# Patient Record
Sex: FEMALE | Race: BLACK OR AFRICAN AMERICAN | Employment: UNEMPLOYED | URBAN - METROPOLITAN AREA
[De-identification: names, ages, dates, MRNs, and addresses within clinical notes are randomized per-mention and may not be internally consistent; named-entity substitution may affect disease eponyms.]

---

## 2017-02-16 RX ORDER — LORATADINE 10 MG/1
TABLET ORAL
Qty: 30 TABLET | Refills: 0 | Status: SHIPPED | OUTPATIENT
Start: 2017-02-16 | End: 2017-04-03 | Stop reason: SDUPTHER

## 2017-04-04 RX ORDER — LORATADINE 10 MG/1
TABLET ORAL
Qty: 30 TABLET | Refills: 0 | Status: SHIPPED | OUTPATIENT
Start: 2017-04-04 | End: 2021-05-05 | Stop reason: SDUPTHER

## 2017-06-16 RX ORDER — ALBUTEROL SULFATE 2.5 MG/3ML
SOLUTION RESPIRATORY (INHALATION)
Qty: 360 ML | Refills: 3 | Status: SHIPPED | OUTPATIENT
Start: 2017-06-16

## 2017-09-11 RX ORDER — FLUCONAZOLE 150 MG/1
TABLET ORAL
Qty: 1 TABLET | Refills: 0 | Status: SHIPPED | OUTPATIENT
Start: 2017-09-11 | End: 2021-05-05

## 2018-08-31 ENCOUNTER — HOSPITAL ENCOUNTER (OUTPATIENT)
Age: 53
Discharge: HOME | End: 2018-08-31
Payer: COMMERCIAL

## 2018-08-31 LAB
-: NORMAL
ABSOLUTE EOS #: 0.22 K/UL (ref 0–0.44)
ABSOLUTE IMMATURE GRANULOCYTE: <0.03 K/UL (ref 0–0.3)
ABSOLUTE LYMPH #: 3.04 K/UL (ref 1.1–3.7)
ABSOLUTE MONO #: 0.28 K/UL (ref 0.1–1.2)
ALBUMIN SERPL-MCNC: 4.5 G/DL (ref 3.5–5.2)
ALBUMIN/GLOBULIN RATIO: 1.6 (ref 1–2.5)
ALP BLD-CCNC: 81 U/L (ref 35–104)
ALT SERPL-CCNC: 20 U/L (ref 5–33)
AMORPHOUS: NORMAL
ANION GAP SERPL CALCULATED.3IONS-SCNC: 13 MMOL/L (ref 9–17)
AST SERPL-CCNC: 18 U/L
BACTERIA: NORMAL
BASOPHILS # BLD: 1 % (ref 0–2)
BASOPHILS ABSOLUTE: 0.04 K/UL (ref 0–0.2)
BILIRUB SERPL-MCNC: 0.37 MG/DL (ref 0.3–1.2)
BILIRUBIN URINE: NEGATIVE
BUN BLDV-MCNC: 9 MG/DL (ref 6–20)
BUN/CREAT BLD: ABNORMAL (ref 9–20)
CALCIUM SERPL-MCNC: 9.5 MG/DL (ref 8.6–10.4)
CASTS UA: NORMAL /LPF (ref 0–8)
CHLORIDE BLD-SCNC: 105 MMOL/L (ref 98–107)
CHOLESTEROL/HDL RATIO: 2.8
CHOLESTEROL: 176 MG/DL
CO2: 19 MMOL/L (ref 20–31)
COLOR: YELLOW
COMMENT UA: ABNORMAL
CREAT SERPL-MCNC: 0.74 MG/DL (ref 0.5–0.9)
CRYSTALS, UA: NORMAL /HPF
DIFFERENTIAL TYPE: ABNORMAL
EOSINOPHILS RELATIVE PERCENT: 4 % (ref 1–4)
EPITHELIAL CELLS UA: NORMAL /HPF (ref 0–5)
ESTIMATED AVERAGE GLUCOSE: 123 MG/DL
GFR AFRICAN AMERICAN: >60 ML/MIN
GFR NON-AFRICAN AMERICAN: >60 ML/MIN
GFR SERPL CREATININE-BSD FRML MDRD: ABNORMAL ML/MIN/{1.73_M2}
GFR SERPL CREATININE-BSD FRML MDRD: ABNORMAL ML/MIN/{1.73_M2}
GLUCOSE BLD-MCNC: 81 MG/DL (ref 70–99)
GLUCOSE URINE: NEGATIVE
HBA1C MFR BLD: 5.9 % (ref 4–6)
HCT VFR BLD CALC: 44 % (ref 36.3–47.1)
HDLC SERPL-MCNC: 63 MG/DL
HEMOGLOBIN: 13.8 G/DL (ref 11.9–15.1)
IMMATURE GRANULOCYTES: 0 %
KETONES, URINE: NEGATIVE
LDL CHOLESTEROL: 99 MG/DL (ref 0–130)
LEUKOCYTE ESTERASE, URINE: ABNORMAL
LYMPHOCYTES # BLD: 57 % (ref 24–43)
MCH RBC QN AUTO: 28.8 PG (ref 25.2–33.5)
MCHC RBC AUTO-ENTMCNC: 31.4 G/DL (ref 28.4–34.8)
MCV RBC AUTO: 91.9 FL (ref 82.6–102.9)
MONOCYTES # BLD: 5 % (ref 3–12)
MUCUS: NORMAL
NITRITE, URINE: NEGATIVE
NRBC AUTOMATED: 0 PER 100 WBC
OTHER OBSERVATIONS UA: NORMAL
PDW BLD-RTO: 13.1 % (ref 11.8–14.4)
PH UA: 7.5 (ref 5–8)
PLATELET # BLD: 276 K/UL (ref 138–453)
PLATELET ESTIMATE: ABNORMAL
PMV BLD AUTO: 9.5 FL (ref 8.1–13.5)
POTASSIUM SERPL-SCNC: 3.7 MMOL/L (ref 3.7–5.3)
PROTEIN UA: NEGATIVE
RBC # BLD: 4.79 M/UL (ref 3.95–5.11)
RBC # BLD: ABNORMAL 10*6/UL
RBC UA: NORMAL /HPF (ref 0–4)
RENAL EPITHELIAL, UA: NORMAL /HPF
SEG NEUTROPHILS: 33 % (ref 36–65)
SEGMENTED NEUTROPHILS ABSOLUTE COUNT: 1.77 K/UL (ref 1.5–8.1)
SODIUM BLD-SCNC: 137 MMOL/L (ref 135–144)
SPECIFIC GRAVITY UA: 1.02 (ref 1–1.03)
THYROXINE, FREE: 1.45 NG/DL (ref 0.93–1.7)
TOTAL PROTEIN: 7.4 G/DL (ref 6.4–8.3)
TRICHOMONAS: NORMAL
TRIGL SERPL-MCNC: 68 MG/DL
TSH SERPL DL<=0.05 MIU/L-ACNC: 0.64 MIU/L (ref 0.3–5)
TURBIDITY: CLEAR
URINE HGB: NEGATIVE
UROBILINOGEN, URINE: NORMAL
VITAMIN D 25-HYDROXY: 16.3 NG/ML (ref 30–100)
VLDLC SERPL CALC-MCNC: NORMAL MG/DL (ref 1–30)
WBC # BLD: 5.4 K/UL (ref 3.5–11.3)
WBC # BLD: ABNORMAL 10*3/UL
WBC UA: NORMAL /HPF (ref 0–5)
YEAST: NORMAL

## 2018-08-31 PROCEDURE — 84443 ASSAY THYROID STIM HORMONE: CPT

## 2018-08-31 PROCEDURE — 81001 URINALYSIS AUTO W/SCOPE: CPT

## 2018-08-31 PROCEDURE — 85025 COMPLETE CBC W/AUTO DIFF WBC: CPT

## 2018-08-31 PROCEDURE — 80053 COMPREHEN METABOLIC PANEL: CPT

## 2018-08-31 PROCEDURE — 80061 LIPID PANEL: CPT

## 2018-08-31 PROCEDURE — 36415 COLL VENOUS BLD VENIPUNCTURE: CPT

## 2018-08-31 PROCEDURE — 84439 ASSAY OF FREE THYROXINE: CPT

## 2018-08-31 PROCEDURE — 83036 HEMOGLOBIN GLYCOSYLATED A1C: CPT

## 2018-08-31 PROCEDURE — 82306 VITAMIN D 25 HYDROXY: CPT

## 2019-02-02 ENCOUNTER — TELEPHONE (OUTPATIENT)
Dept: GASTROENTEROLOGY | Age: 54
End: 2019-02-02

## 2019-02-19 ENCOUNTER — HOSPITAL ENCOUNTER (OUTPATIENT)
Age: 54
Setting detail: SPECIMEN
Discharge: HOME | End: 2019-02-19
Payer: COMMERCIAL

## 2019-02-20 LAB
DIRECT EXAM: NORMAL
Lab: NORMAL
SPECIMEN DESCRIPTION: NORMAL

## 2019-03-08 ENCOUNTER — TELEPHONE (OUTPATIENT)
Dept: GASTROENTEROLOGY | Age: 54
End: 2019-03-08

## 2019-04-24 ENCOUNTER — HOSPITAL ENCOUNTER (OUTPATIENT)
Dept: NON INVASIVE DIAGNOSTICS | Age: 54
Discharge: HOME | End: 2019-04-24
Payer: COMMERCIAL

## 2019-04-24 LAB
LV EF: 56 %
LVEF MODALITY: NORMAL

## 2019-04-24 PROCEDURE — 93306 TTE W/DOPPLER COMPLETE: CPT

## 2019-05-07 ENCOUNTER — TELEPHONE (OUTPATIENT)
Dept: GASTROENTEROLOGY | Age: 54
End: 2019-05-07

## 2019-05-08 NOTE — TELEPHONE ENCOUNTER
Per previous TE dated 3/8/19, we are waiting on return from patient in regards to date of her last colonoscopy. Writer left msg on pt's answering machine in regards to finding out the date of last colonoscopy.

## 2019-07-18 ENCOUNTER — HOSPITAL ENCOUNTER (OUTPATIENT)
Age: 54
Discharge: HOME | End: 2019-07-18
Payer: COMMERCIAL

## 2019-07-18 LAB
ALT SERPL-CCNC: 16 U/L (ref 5–33)
AST SERPL-CCNC: 16 U/L

## 2019-07-18 PROCEDURE — 84460 ALANINE AMINO (ALT) (SGPT): CPT

## 2019-07-18 PROCEDURE — 80074 ACUTE HEPATITIS PANEL: CPT

## 2019-07-18 PROCEDURE — 84450 TRANSFERASE (AST) (SGOT): CPT

## 2019-07-18 PROCEDURE — 36415 COLL VENOUS BLD VENIPUNCTURE: CPT

## 2019-07-19 LAB
HAV IGM SER IA-ACNC: NONREACTIVE
HEPATITIS B CORE IGM ANTIBODY: NONREACTIVE
HEPATITIS B SURFACE ANTIGEN: NONREACTIVE
HEPATITIS C ANTIBODY: NONREACTIVE

## 2019-08-20 ENCOUNTER — HOSPITAL ENCOUNTER (OUTPATIENT)
Age: 54
Discharge: HOME | End: 2019-08-20
Payer: COMMERCIAL

## 2019-08-20 LAB
ALBUMIN SERPL-MCNC: 4.5 G/DL (ref 3.5–5.2)
ALBUMIN/GLOBULIN RATIO: 1.7 (ref 1–2.5)
ALP BLD-CCNC: 81 U/L (ref 35–104)
ALT SERPL-CCNC: 14 U/L (ref 5–33)
AST SERPL-CCNC: 15 U/L
BILIRUB SERPL-MCNC: 0.36 MG/DL (ref 0.3–1.2)
BILIRUBIN DIRECT: 0.08 MG/DL
BILIRUBIN, INDIRECT: 0.28 MG/DL (ref 0–1)
GLOBULIN: NORMAL G/DL (ref 1.5–3.8)
TOTAL PROTEIN: 7.2 G/DL (ref 6.4–8.3)

## 2019-08-20 PROCEDURE — 80076 HEPATIC FUNCTION PANEL: CPT

## 2019-08-20 PROCEDURE — 36415 COLL VENOUS BLD VENIPUNCTURE: CPT

## 2019-10-31 ENCOUNTER — HOSPITAL ENCOUNTER (EMERGENCY)
Age: 54
Discharge: HOME | End: 2019-10-31
Attending: EMERGENCY MEDICINE
Payer: COMMERCIAL

## 2019-10-31 ENCOUNTER — APPOINTMENT (OUTPATIENT)
Dept: GENERAL RADIOLOGY | Age: 54
End: 2019-10-31
Payer: COMMERCIAL

## 2019-10-31 VITALS
DIASTOLIC BLOOD PRESSURE: 87 MMHG | OXYGEN SATURATION: 100 % | HEART RATE: 84 BPM | TEMPERATURE: 97.5 F | SYSTOLIC BLOOD PRESSURE: 136 MMHG | RESPIRATION RATE: 16 BRPM

## 2019-10-31 DIAGNOSIS — J06.9 VIRAL UPPER RESPIRATORY INFECTION: Primary | ICD-10-CM

## 2019-10-31 LAB
ABSOLUTE EOS #: 0.12 K/UL (ref 0–0.44)
ABSOLUTE IMMATURE GRANULOCYTE: <0.03 K/UL (ref 0–0.3)
ABSOLUTE LYMPH #: 2.33 K/UL (ref 1.1–3.7)
ABSOLUTE MONO #: 0.54 K/UL (ref 0.1–1.2)
ANION GAP SERPL CALCULATED.3IONS-SCNC: 13 MMOL/L (ref 9–17)
BASOPHILS # BLD: 1 % (ref 0–2)
BASOPHILS ABSOLUTE: 0.04 K/UL (ref 0–0.2)
BUN BLDV-MCNC: 12 MG/DL (ref 6–20)
BUN/CREAT BLD: ABNORMAL (ref 9–20)
CALCIUM SERPL-MCNC: 9.5 MG/DL (ref 8.6–10.4)
CHLORIDE BLD-SCNC: 107 MMOL/L (ref 98–107)
CO2: 23 MMOL/L (ref 20–31)
CREAT SERPL-MCNC: 0.86 MG/DL (ref 0.5–0.9)
DIFFERENTIAL TYPE: ABNORMAL
EOSINOPHILS RELATIVE PERCENT: 2 % (ref 1–4)
GFR AFRICAN AMERICAN: >60 ML/MIN
GFR NON-AFRICAN AMERICAN: >60 ML/MIN
GFR SERPL CREATININE-BSD FRML MDRD: ABNORMAL ML/MIN/{1.73_M2}
GFR SERPL CREATININE-BSD FRML MDRD: ABNORMAL ML/MIN/{1.73_M2}
GLUCOSE BLD-MCNC: 87 MG/DL (ref 70–99)
HCT VFR BLD CALC: 42.9 % (ref 36.3–47.1)
HEMOGLOBIN: 13.6 G/DL (ref 11.9–15.1)
IMMATURE GRANULOCYTES: 0 %
LYMPHOCYTES # BLD: 47 % (ref 24–43)
MCH RBC QN AUTO: 29.3 PG (ref 25.2–33.5)
MCHC RBC AUTO-ENTMCNC: 31.7 G/DL (ref 28.4–34.8)
MCV RBC AUTO: 92.5 FL (ref 82.6–102.9)
MONOCYTES # BLD: 11 % (ref 3–12)
NRBC AUTOMATED: 0 PER 100 WBC
PDW BLD-RTO: 13.2 % (ref 11.8–14.4)
PLATELET # BLD: 308 K/UL (ref 138–453)
PLATELET ESTIMATE: ABNORMAL
PMV BLD AUTO: 9.5 FL (ref 8.1–13.5)
POTASSIUM SERPL-SCNC: 3.5 MMOL/L (ref 3.7–5.3)
RBC # BLD: 4.64 M/UL (ref 3.95–5.11)
RBC # BLD: ABNORMAL 10*6/UL
SEG NEUTROPHILS: 39 % (ref 36–65)
SEGMENTED NEUTROPHILS ABSOLUTE COUNT: 1.98 K/UL (ref 1.5–8.1)
SODIUM BLD-SCNC: 143 MMOL/L (ref 135–144)
TROPONIN INTERP: NORMAL
TROPONIN T: NORMAL NG/ML
TROPONIN, HIGH SENSITIVITY: <6 NG/L (ref 0–14)
WBC # BLD: 5 K/UL (ref 3.5–11.3)
WBC # BLD: ABNORMAL 10*3/UL

## 2019-10-31 PROCEDURE — 80048 BASIC METABOLIC PNL TOTAL CA: CPT

## 2019-10-31 PROCEDURE — 93005 ELECTROCARDIOGRAM TRACING: CPT

## 2019-10-31 PROCEDURE — 85025 COMPLETE CBC W/AUTO DIFF WBC: CPT

## 2019-10-31 PROCEDURE — 71046 X-RAY EXAM CHEST 2 VIEWS: CPT

## 2019-10-31 PROCEDURE — 84484 ASSAY OF TROPONIN QUANT: CPT

## 2019-10-31 PROCEDURE — 99285 EMERGENCY DEPT VISIT HI MDM: CPT

## 2019-10-31 RX ORDER — BENZONATATE 100 MG/1
100 CAPSULE ORAL 3 TIMES DAILY PRN
Qty: 15 CAPSULE | Refills: 0 | Status: SHIPPED | OUTPATIENT
Start: 2019-10-31 | End: 2019-11-07

## 2019-10-31 RX ORDER — ACETAMINOPHEN 325 MG/1
650 TABLET ORAL EVERY 6 HOURS PRN
Qty: 20 TABLET | Refills: 0 | Status: SHIPPED | OUTPATIENT
Start: 2019-10-31 | End: 2021-05-05

## 2019-10-31 RX ORDER — IBUPROFEN 600 MG/1
600 TABLET ORAL EVERY 6 HOURS PRN
Qty: 20 TABLET | Refills: 0 | Status: SHIPPED | OUTPATIENT
Start: 2019-10-31 | End: 2019-12-16

## 2019-10-31 ASSESSMENT — PAIN SCALES - GENERAL: PAINLEVEL_OUTOF10: 6

## 2019-10-31 ASSESSMENT — ENCOUNTER SYMPTOMS
SHORTNESS OF BREATH: 1
NAUSEA: 0
DIARRHEA: 0
VOMITING: 0
RHINORRHEA: 1
ABDOMINAL PAIN: 0
COUGH: 1
SORE THROAT: 0

## 2019-10-31 ASSESSMENT — PAIN DESCRIPTION - DESCRIPTORS: DESCRIPTORS: ACHING;SORE

## 2019-10-31 ASSESSMENT — HEART SCORE: ECG: 0

## 2019-10-31 ASSESSMENT — PAIN DESCRIPTION - PAIN TYPE: TYPE: ACUTE PAIN

## 2019-10-31 ASSESSMENT — PAIN DESCRIPTION - FREQUENCY: FREQUENCY: CONTINUOUS

## 2019-10-31 ASSESSMENT — PAIN DESCRIPTION - LOCATION: LOCATION: GENERALIZED

## 2019-11-04 LAB
EKG ATRIAL RATE: 78 BPM
EKG P AXIS: 58 DEGREES
EKG P-R INTERVAL: 146 MS
EKG Q-T INTERVAL: 372 MS
EKG QRS DURATION: 78 MS
EKG QTC CALCULATION (BAZETT): 424 MS
EKG R AXIS: -31 DEGREES
EKG T AXIS: 15 DEGREES
EKG VENTRICULAR RATE: 78 BPM

## 2019-12-16 ENCOUNTER — HOSPITAL ENCOUNTER (EMERGENCY)
Age: 54
Discharge: HOME | End: 2019-12-16
Attending: EMERGENCY MEDICINE
Payer: COMMERCIAL

## 2019-12-16 ENCOUNTER — APPOINTMENT (OUTPATIENT)
Dept: GENERAL RADIOLOGY | Age: 54
End: 2019-12-16
Payer: COMMERCIAL

## 2019-12-16 VITALS
DIASTOLIC BLOOD PRESSURE: 76 MMHG | TEMPERATURE: 97.4 F | RESPIRATION RATE: 18 BRPM | OXYGEN SATURATION: 97 % | HEART RATE: 68 BPM | SYSTOLIC BLOOD PRESSURE: 110 MMHG

## 2019-12-16 DIAGNOSIS — M25.562 LEFT KNEE PAIN, UNSPECIFIED CHRONICITY: Primary | ICD-10-CM

## 2019-12-16 LAB
-: NORMAL
ABSOLUTE EOS #: 0.1 K/UL (ref 0–0.44)
ABSOLUTE IMMATURE GRANULOCYTE: <0.03 K/UL (ref 0–0.3)
ABSOLUTE LYMPH #: 2.62 K/UL (ref 1.1–3.7)
ABSOLUTE MONO #: 0.48 K/UL (ref 0.1–1.2)
AMORPHOUS: NORMAL
ANION GAP SERPL CALCULATED.3IONS-SCNC: 8 MMOL/L (ref 9–17)
BACTERIA: NORMAL
BASOPHILS # BLD: 1 % (ref 0–2)
BASOPHILS ABSOLUTE: 0.03 K/UL (ref 0–0.2)
BILIRUBIN URINE: NEGATIVE
BUN BLDV-MCNC: 12 MG/DL (ref 6–20)
BUN/CREAT BLD: ABNORMAL (ref 9–20)
C-REACTIVE PROTEIN: <0.3 MG/L (ref 0–5)
CALCIUM SERPL-MCNC: 9 MG/DL (ref 8.6–10.4)
CASTS UA: NORMAL /LPF (ref 0–8)
CHLORIDE BLD-SCNC: 109 MMOL/L (ref 98–107)
CO2: 26 MMOL/L (ref 20–31)
COLOR: YELLOW
COMMENT UA: ABNORMAL
CREAT SERPL-MCNC: 0.65 MG/DL (ref 0.5–0.9)
CRYSTALS, UA: NORMAL /HPF
DIFFERENTIAL TYPE: ABNORMAL
EOSINOPHILS RELATIVE PERCENT: 2 % (ref 1–4)
EPITHELIAL CELLS UA: NORMAL /HPF (ref 0–5)
GFR AFRICAN AMERICAN: >60 ML/MIN
GFR NON-AFRICAN AMERICAN: >60 ML/MIN
GFR SERPL CREATININE-BSD FRML MDRD: ABNORMAL ML/MIN/{1.73_M2}
GFR SERPL CREATININE-BSD FRML MDRD: ABNORMAL ML/MIN/{1.73_M2}
GLUCOSE BLD-MCNC: 86 MG/DL (ref 70–99)
GLUCOSE URINE: NEGATIVE
HCT VFR BLD CALC: 40 % (ref 36.3–47.1)
HEMOGLOBIN: 12.7 G/DL (ref 11.9–15.1)
IMMATURE GRANULOCYTES: 0 %
KETONES, URINE: NEGATIVE
LEUKOCYTE ESTERASE, URINE: ABNORMAL
LYMPHOCYTES # BLD: 50 % (ref 24–43)
MCH RBC QN AUTO: 29.4 PG (ref 25.2–33.5)
MCHC RBC AUTO-ENTMCNC: 31.8 G/DL (ref 28.4–34.8)
MCV RBC AUTO: 92.6 FL (ref 82.6–102.9)
MONOCYTES # BLD: 9 % (ref 3–12)
MUCUS: NORMAL
NITRITE, URINE: NEGATIVE
NRBC AUTOMATED: 0 PER 100 WBC
OTHER OBSERVATIONS UA: NORMAL
PDW BLD-RTO: 13.2 % (ref 11.8–14.4)
PH UA: 6 (ref 5–8)
PLATELET # BLD: 276 K/UL (ref 138–453)
PLATELET ESTIMATE: ABNORMAL
PMV BLD AUTO: 9.5 FL (ref 8.1–13.5)
POTASSIUM SERPL-SCNC: 3.8 MMOL/L (ref 3.7–5.3)
PROTEIN UA: NEGATIVE
RBC # BLD: 4.32 M/UL (ref 3.95–5.11)
RBC # BLD: ABNORMAL 10*6/UL
RBC UA: NORMAL /HPF (ref 0–4)
RENAL EPITHELIAL, UA: NORMAL /HPF
SEG NEUTROPHILS: 38 % (ref 36–65)
SEGMENTED NEUTROPHILS ABSOLUTE COUNT: 2.02 K/UL (ref 1.5–8.1)
SODIUM BLD-SCNC: 143 MMOL/L (ref 135–144)
SPECIFIC GRAVITY UA: 1.02 (ref 1–1.03)
TRICHOMONAS: NORMAL
TURBIDITY: CLEAR
URINE HGB: NEGATIVE
UROBILINOGEN, URINE: NORMAL
WBC # BLD: 5.3 K/UL (ref 3.5–11.3)
WBC # BLD: ABNORMAL 10*3/UL
WBC UA: NORMAL /HPF (ref 0–5)
YEAST: NORMAL

## 2019-12-16 PROCEDURE — 73562 X-RAY EXAM OF KNEE 3: CPT

## 2019-12-16 PROCEDURE — 81001 URINALYSIS AUTO W/SCOPE: CPT

## 2019-12-16 PROCEDURE — 80048 BASIC METABOLIC PNL TOTAL CA: CPT

## 2019-12-16 PROCEDURE — 87086 URINE CULTURE/COLONY COUNT: CPT

## 2019-12-16 PROCEDURE — 96374 THER/PROPH/DIAG INJ IV PUSH: CPT

## 2019-12-16 PROCEDURE — 93970 EXTREMITY STUDY: CPT

## 2019-12-16 PROCEDURE — 86140 C-REACTIVE PROTEIN: CPT

## 2019-12-16 PROCEDURE — 6360000002 HC RX W HCPCS: Performed by: STUDENT IN AN ORGANIZED HEALTH CARE EDUCATION/TRAINING PROGRAM

## 2019-12-16 PROCEDURE — 99284 EMERGENCY DEPT VISIT MOD MDM: CPT

## 2019-12-16 PROCEDURE — 85025 COMPLETE CBC W/AUTO DIFF WBC: CPT

## 2019-12-16 RX ORDER — IBUPROFEN 800 MG/1
800 TABLET ORAL EVERY 6 HOURS PRN
Qty: 21 TABLET | Refills: 0 | Status: SHIPPED | OUTPATIENT
Start: 2019-12-16 | End: 2021-05-05

## 2019-12-16 RX ORDER — KETOROLAC TROMETHAMINE 30 MG/ML
30 INJECTION, SOLUTION INTRAMUSCULAR; INTRAVENOUS ONCE
Status: DISCONTINUED | OUTPATIENT
Start: 2019-12-16 | End: 2019-12-16

## 2019-12-16 RX ORDER — KETOROLAC TROMETHAMINE 15 MG/ML
15 INJECTION, SOLUTION INTRAMUSCULAR; INTRAVENOUS ONCE
Status: COMPLETED | OUTPATIENT
Start: 2019-12-16 | End: 2019-12-16

## 2019-12-16 RX ADMIN — KETOROLAC TROMETHAMINE 15 MG: 15 INJECTION, SOLUTION INTRAMUSCULAR; INTRAVENOUS at 15:46

## 2019-12-16 ASSESSMENT — ENCOUNTER SYMPTOMS
SHORTNESS OF BREATH: 0
TROUBLE SWALLOWING: 0
VOMITING: 0
DIARRHEA: 0
FACIAL SWELLING: 0
BACK PAIN: 0
EYE PAIN: 0
VOICE CHANGE: 0
EYE ITCHING: 0
COUGH: 0
PHOTOPHOBIA: 0
RHINORRHEA: 0
NAUSEA: 0
EYE REDNESS: 0
SORE THROAT: 0
CONSTIPATION: 0
WHEEZING: 0
ABDOMINAL PAIN: 0

## 2019-12-16 ASSESSMENT — PAIN SCALES - GENERAL: PAINLEVEL_OUTOF10: 8

## 2019-12-17 LAB
CULTURE: NORMAL
Lab: NORMAL
SPECIMEN DESCRIPTION: NORMAL

## 2019-12-23 ENCOUNTER — OFFICE VISIT (OUTPATIENT)
Dept: ORTHOPEDIC SURGERY | Age: 54
End: 2019-12-23
Payer: COMMERCIAL

## 2019-12-23 VITALS — WEIGHT: 169.97 LBS | HEIGHT: 66 IN | BODY MASS INDEX: 27.32 KG/M2

## 2019-12-23 DIAGNOSIS — M17.0 BILATERAL PRIMARY OSTEOARTHRITIS OF KNEE: Primary | ICD-10-CM

## 2019-12-23 PROCEDURE — G8419 CALC BMI OUT NRM PARAM NOF/U: HCPCS | Performed by: ORTHOPAEDIC SURGERY

## 2019-12-23 PROCEDURE — 20610 DRAIN/INJ JOINT/BURSA W/O US: CPT | Performed by: ORTHOPAEDIC SURGERY

## 2019-12-23 PROCEDURE — 3017F COLORECTAL CA SCREEN DOC REV: CPT | Performed by: ORTHOPAEDIC SURGERY

## 2019-12-23 PROCEDURE — 99204 OFFICE O/P NEW MOD 45 MIN: CPT | Performed by: ORTHOPAEDIC SURGERY

## 2019-12-23 PROCEDURE — G8427 DOCREV CUR MEDS BY ELIG CLIN: HCPCS | Performed by: ORTHOPAEDIC SURGERY

## 2019-12-23 PROCEDURE — G8484 FLU IMMUNIZE NO ADMIN: HCPCS | Performed by: ORTHOPAEDIC SURGERY

## 2019-12-23 PROCEDURE — 4004F PT TOBACCO SCREEN RCVD TLK: CPT | Performed by: ORTHOPAEDIC SURGERY

## 2019-12-23 RX ORDER — BUPIVACAINE HYDROCHLORIDE 2.5 MG/ML
2 INJECTION, SOLUTION INFILTRATION; PERINEURAL ONCE
Status: COMPLETED | OUTPATIENT
Start: 2019-12-23 | End: 2019-12-23

## 2019-12-23 RX ORDER — METHYLPREDNISOLONE ACETATE 80 MG/ML
80 INJECTION, SUSPENSION INTRA-ARTICULAR; INTRALESIONAL; INTRAMUSCULAR; SOFT TISSUE ONCE
Status: COMPLETED | OUTPATIENT
Start: 2019-12-23 | End: 2019-12-23

## 2019-12-23 RX ADMIN — BUPIVACAINE HYDROCHLORIDE 5 MG: 2.5 INJECTION, SOLUTION INFILTRATION; PERINEURAL at 15:57

## 2019-12-23 RX ADMIN — METHYLPREDNISOLONE ACETATE 80 MG: 80 INJECTION, SUSPENSION INTRA-ARTICULAR; INTRALESIONAL; INTRAMUSCULAR; SOFT TISSUE at 15:59

## 2019-12-23 RX ADMIN — BUPIVACAINE HYDROCHLORIDE 5 MG: 2.5 INJECTION, SOLUTION INFILTRATION; PERINEURAL at 15:58

## 2019-12-30 ENCOUNTER — HOSPITAL ENCOUNTER (OUTPATIENT)
Dept: PHYSICAL THERAPY | Age: 54
Setting detail: THERAPIES SERIES
Discharge: HOME | End: 2019-12-30
Payer: COMMERCIAL

## 2019-12-30 PROCEDURE — 97110 THERAPEUTIC EXERCISES: CPT

## 2019-12-30 PROCEDURE — 97161 PT EVAL LOW COMPLEX 20 MIN: CPT

## 2020-01-07 ENCOUNTER — HOSPITAL ENCOUNTER (OUTPATIENT)
Dept: PHYSICAL THERAPY | Age: 55
Setting detail: THERAPIES SERIES
Discharge: HOME | End: 2020-01-07
Payer: COMMERCIAL

## 2020-01-07 PROCEDURE — 97110 THERAPEUTIC EXERCISES: CPT

## 2020-01-07 NOTE — FLOWSHEET NOTE
[x] [facility]  [address]  [phone]  F: [phone]     Physical Therapy Daily Treatment Note    Date:  2020  Patient Name:  Marcianne Krabbe      :  1965    MRN: [id number]  Physician: Jesica Laws DO/ Ines Pallas, DO       Insurance: [employer] 30 v  Medical Diagnosis: Bilateral knee OA M17.0                        Rehab Codes: H00.964, M25.561, M25.661, M25.662  Onset Date:                                    Next 's appt: ~3/23/2020     Visit# / total visits:    Cancels/No Shows: 0/0    Subjective:    Pain:  [] Yes  [] No Location: B knees   Pain Rating: (0-10 scale) 5/10  Pain altered Tx:  [x] No  [] Yes  Action:  Comments: patient reports knees \"are not doing good\" today in regards to her pain. States L knee tends to be more bothersome than the R knee. Objective:  Modalities:   Precautions:  Exercises:  Exercise Reps/ Time Weight/ Level Comments   SciFit            Seated      Hamstring Stretch on Step Stool  3x20\" ea     LAQ 15x ea           Supine      Quadriceps Isometric 10x5\" ea     Hamstring Isometric 10x5\" ea     Hip Adduction Isometric 15x5\"           SLR 10x ea     Heel Slides 15x ea     Bridge 15x     SAQ 15x ea           Other:    Specific Instructions for next treatment:  Bilateral knee and hip strengthening; progress slowly      Treatment Charges: Mins Units   [x]  HP 12 0   []  Ther Exercise 30 2   []  Manual Therapy     []  Ther Activities     []  Aquatics     []  Vasocompression     []  Other     Total Treatment time 30 2       Assessment: [x] Progressing toward goals: pt considerably deconditioned, limiting overall progressions to be made this date. While performing supine ex's, patient notes that she was sweating and was visibly perspiring. Distal IT band tightness in R knee, with lateral tracking noted. When palpated, pt is quite tender and reports pressure being painful. [] No change. [] Other:       STG: (to be met in 8 treatments)  1. ? Pain: 4/10 bilateral knee pain with standing activity. 2. ? ROM:Bilateral knee flexion 0-130 degrees to improve squatting and stair climbing. 3. ? Strength: 5/5 bilateral knee and hip strength to improve joint stability. 4. ? Function: LEFS score to 50% functional.   5. Independent with Home Exercise Programs     LTG: (to be met in 12 treatments)  1. Patient will report decreased incidence of knee swelling. 2. Patient will be able to stand/work for 8 hours.         Patient goals: Reduce pain, improve standing tolerance, reduce swelling.        Pt. Education:  [x] Yes  [] No  [x] Reviewed Prior HEP/Ed  Method of Education: [x] Verbal  [x] Demo  [] Written  Comprehension of Education:  [x] Verbalizes understanding. [] Demonstrates understanding. [x] Needs review. [] Demonstrates/verbalizes HEP/Ed previously given. Plan: [x] Continue per plan of care.    [] Other:   Frequency:  2x/week for 12 visits        Time In: 1119             Time Out: [address]      Electronically signed by:  Stella Boris, PTA

## 2020-01-09 ENCOUNTER — HOSPITAL ENCOUNTER (OUTPATIENT)
Dept: PHYSICAL THERAPY | Age: 55
Setting detail: THERAPIES SERIES
Discharge: HOME | End: 2020-01-09
Payer: COMMERCIAL

## 2020-01-09 PROCEDURE — 97110 THERAPEUTIC EXERCISES: CPT

## 2020-01-09 NOTE — FLOWSHEET NOTE
[x] [facility] &  Therapy  [address]  [phone]  F: [phone]     Physical Therapy Daily Treatment Note    Date:  2020  Patient Name:  Gloria Croft      :  1965    MRN: [id number]  Physician: Tiana Bryan DO/ Vicki Granger DO       Insurance: [employer]  Medical Diagnosis: Bilateral knee OA M17.0                        Rehab Codes: T35.834, M25.561, M25.661, M25.662  Onset Date: 2009                                   Next 's appt: ~3/23/2020     Visit# / total visits: 3/12   Cancels/No Shows: 0/0    Subjective:    Pain:  [] Yes  [x] No Location: B knees   Pain Rating: (0-10 scale) 0/10  Pain altered Tx:  [x] No  [] Yes  Action:  Comments: patient arrives with no complaints of knee pain this date. reports the HP following last Tx really helped relieve her knee pain. Objective:  Modalities:   Precautions:  Exercises:  Exercise Reps/ Time Weight/ Level Comments   SciFit 5 min Level 4          Standing      Gastroc Stretch on Wedge 3x30\"  Added 2020   Heel Raises 2x10  Added 2020               Seated      Hamstring Stretch on Step Stool  3x20\" ea     LAQ 15x ea     HS Curls 15x ea [place]          Supine      Quadriceps Isometric 10x5\" ea     Hamstring Isometric 10x5\" ea     Hip Adduction Isometric 15x5\"           SLR 15x ea  In sets as needed due to fatigue   Heel Slides 15x ea     Bridge 15x     SAQ 15x ea           Other:    Specific Instructions for next treatment:  Bilateral knee and hip strengthening; progress slowly      Treatment Charges: Mins Units   [x]  HP 15 0   []  Ther Exercise 40 3   []  Manual Therapy     []  Ther Activities     []  Aquatics     []  Vasocompression     []  Other     Total Treatment time 40 3       Assessment: [x] Progressing toward goals: continued with therapeutic exercises to improve LE strength and mobility for improved function.  Pt is heavily limited due to general deconditioning and

## 2020-01-14 ENCOUNTER — HOSPITAL ENCOUNTER (OUTPATIENT)
Dept: PHYSICAL THERAPY | Age: 55
Setting detail: THERAPIES SERIES
Discharge: HOME | End: 2020-01-14
Payer: COMMERCIAL

## 2020-01-14 PROCEDURE — 97110 THERAPEUTIC EXERCISES: CPT

## 2020-01-14 PROCEDURE — 97530 THERAPEUTIC ACTIVITIES: CPT

## 2020-01-14 NOTE — FLOWSHEET NOTE
[x] [facility]  [address]  [phone]  F: [phone]     Physical Therapy Daily Treatment Note    Date:  2020  Patient Name:  Marcianne Krabbe      :  1965    MRN: [id number]  Physician: Jesica Laws DO/ Ines Pallas, DO       Insurance: [employer] 30 v  Medical Diagnosis: Bilateral knee OA M17.0                        Rehab Codes: U46.347, M25.561, M25.661, M25.662  Onset Date:                                    Next 's appt: ~3/23/2020     Visit# / total visits:    Cancels/No Shows: 0/0    Subjective:    Pain:  [] Yes  [x] No Location: B knees   Pain Rating: (0-10 scale) 0/10  Pain altered Tx:  [x] No  [] Yes  Action:  Comments: patient again arrives noting no complaints of knee pain. Stating they have \"felt pretty good\" the past couple of days. Objective:  Modalities: Large HP to B knees in supine at end of Tx 15 min  Precautions: monitor BP,   Exercises:  Exercise Reps/ Time Weight/ Level Comments   SciFit 5 min Level 4          Standing      Gastroc Stretch on Wedge 3x30\"  Added 2020   Heel Raises 2x10  Added 2020   3 - Way Hip 10x ea     March 2x10     HS Curls 2x10           Seated      Hamstring Stretch on Step Stool  3x20\" ea     LAQ 15x ea     HS Curls 15x ea [place]          Supine      Quadriceps Isometric 10x5\" ea     Hamstring Isometric 10x5\" ea     Hip Adduction Isometric 15x5\"           SLR 15x ea  In sets as needed due to fatigue   Heel Slides 15x ea     Bridge 15x     SAQ 15x ea           Other:     Specific Instructions for next treatment:  Bilateral knee and hip strengthening; progress slowly      Treatment Charges: Mins Units   [x]  HP     []  Ther Exercise 25 2   []  Manual Therapy     []  Ther Activities 15 1   []  Aquatics     []  Vasocompression     []  Other     Total Treatment time 40 3       Assessment: [x] Progressing toward goals:     [] No change.      [x] Other: pt wore Holter review. [] Demonstrates/verbalizes HEP/Ed previously given. Plan: [x] Continue per plan of care.    [] Other:   Frequency:  2x/week for 12 visits        Time In: 1100              Time Out: 3383      Electronically signed by:  Denisse Donovan PTA

## 2020-01-16 ENCOUNTER — HOSPITAL ENCOUNTER (OUTPATIENT)
Dept: PHYSICAL THERAPY | Age: 55
Setting detail: THERAPIES SERIES
Discharge: HOME | End: 2020-01-16
Payer: COMMERCIAL

## 2020-01-16 PROCEDURE — 97110 THERAPEUTIC EXERCISES: CPT

## 2020-01-16 NOTE — FLOWSHEET NOTE
3   []  Manual Therapy     []  Ther Activities     []  Aquatics     []  Vasocompression     []  Other     Total Treatment time 45 3       Assessment: [x] Progressing toward goals: good tolerance with therapeutic exercises throughout Tx, with knee pain decreasing as Tx progressed. [] No change. [x] Other: HR monitor worn throughout Tx this date. HR stayed steady in the id 80's while completing standing ex's this date - with no incident of feeling lightheaded or dizzy. HR decreased to ow/mid 70's while completing mat ex's. No sudden spikes in HR noted in standing seated or supine positions while completing ex's this date. STG: (to be met in 8 treatments)  1. ? Pain: 4/10 bilateral knee pain with standing activity. 2. ? ROM:Bilateral knee flexion 0-130 degrees to improve squatting and stair climbing. 3. ? Strength: 5/5 bilateral knee and hip strength to improve joint stability. 4. ? Function: LEFS score to 50% functional.   5. Independent with Home Exercise Programs     LTG: (to be met in 12 treatments)  1. Patient will report decreased incidence of knee swelling. 2. Patient will be able to stand/work for 8 hours.         Patient goals: Reduce pain, improve standing tolerance, reduce swelling.        Pt. Education:  [x] Yes  [] No  [x] Reviewed Prior HEP/Ed  Method of Education: [x] Verbal  [x] Demo  [] Written  Comprehension of Education:  [x] Verbalizes understanding. [] Demonstrates understanding. [x] Needs review. [] Demonstrates/verbalizes HEP/Ed previously given. Plan: [x] Continue per plan of care.    [] Other:   Frequency:  2x/week for 12 visits        Time In: 1105              Time Out: 1200      Electronically signed by:  Stella Yorkshire, PTA

## 2020-01-21 ENCOUNTER — HOSPITAL ENCOUNTER (OUTPATIENT)
Dept: PHYSICAL THERAPY | Age: 55
Setting detail: THERAPIES SERIES
Discharge: HOME | End: 2020-01-21
Payer: COMMERCIAL

## 2020-01-21 PROCEDURE — 97110 THERAPEUTIC EXERCISES: CPT

## 2020-01-21 NOTE — FLOWSHEET NOTE
[x] [facility] &  Therapy  [address]  [phone]  F: [phone]     Physical Therapy Daily Treatment Note    Date:  2020  Patient Name:  Moncho Armando      :  1965    MRN: [id number]  Physician: Dereje Fink DO/ Hallie Romo DO       Insurance: [employer]  Medical Diagnosis: Bilateral knee OA M17.0                        Rehab Codes: L20.965, M25.561, M25.661, M25.662  Onset Date:                                    Next 's appt: 20     Visit# / total visits:    Cancels/No Shows: 0/0    Subjective:    Pain:  [] Yes  [x] No Location: B knees   Pain Rating: (0-10 scale) 6/10 grossly   Pain altered Tx:  [x] No  [] Yes  Action:  Comments: patient reports both of her knees, both of her shoulders and her low back have all been hurting since Saturday - when the temperature dropped.       Objective:  Modalities: Large HP to B knees in supine at end of Tx 15 min    Precautions: monitor HR/BP, suspected cardiovascular involvement     Exercises Completed on 20 marked with \"X\"  Exercise Reps/ Time Weight/ Level Comments    SciFit 5 min Level 4  X          Standing       Gastroc Stretch on Wedge 3x30\"  Added 2020 X   Heel Raises 2x10  Added 1/9/2020 X   3 - Way Hip 10x ea   X   March 2x10 ea   X   HS Curls 2x10 ea   X   Step Up 10x ea 6 inch  X   Step Down 10x ea 4 inch  X   Mini Squats                     Seated       Hamstring Stretch on Step Stool  3x20\" ea   X   LAQ 15x ea      HS Curls 15x ea [place]            Supine       Quadriceps Isometric 10x5\" ea   X   Hamstring Isometric 10x5\" ea      Hip Adduction Isometric 15x5\"             SLR 15x ea  In sets as needed due to fatigue X   Heel Slides 15x ea      Bridge 2x10   X   SAQ 15x ea             Other: **HR monitor worn throughout Tx 2020, 2020  - max  BPM**    Specific Instructions for next treatment:  Bilateral knee and hip strengthening; progress

## 2020-01-23 ENCOUNTER — HOSPITAL ENCOUNTER (OUTPATIENT)
Dept: PHYSICAL THERAPY | Age: 55
Setting detail: THERAPIES SERIES
Discharge: HOME | End: 2020-01-23
Payer: COMMERCIAL

## 2020-01-23 PROCEDURE — 97110 THERAPEUTIC EXERCISES: CPT

## 2020-01-23 NOTE — FLOWSHEET NOTE
treatment:  Bilateral knee and hip strengthening; progress slowly      Treatment Charges: Mins Units   [x]  HP     []  Ther Exercise 40 3   []  Manual Therapy     []  Ther Activities     []  Aquatics     []  Vasocompression     []  Other     Total Treatment time 40 3       Assessment: [x] Progressing toward goals: some ex's held this date, due to pt experiencing lightheadedness following standing ex's this date. Pt moved to supine with legs elevated, reminded to perform diaphragmatic breathing while resting. Able to complete some supine ex's, but overall Tx limited secondary to these symptoms. [] No change. [] Other:          STG: (to be met in 8 treatments)  1. ? Pain: 4/10 bilateral knee pain with standing activity. 2. ? ROM:Bilateral knee flexion 0-130 degrees to improve squatting and stair climbing. 3. ? Strength: 5/5 bilateral knee and hip strength to improve joint stability. 4. ? Function: LEFS score to 50% functional.   5. Independent with Home Exercise Programs     LTG: (to be met in 12 treatments)  1. Patient will report decreased incidence of knee swelling. 2. Patient will be able to stand/work for 8 hours.         Patient goals: Reduce pain, improve standing tolerance, reduce swelling.        Pt. Education:  [x] Yes  [] No  [x] Reviewed Prior HEP/Ed  Method of Education: [x] Verbal  [x] Demo  [] Written  Comprehension of Education:  [x] Verbalizes understanding. [] Demonstrates understanding. [x] Needs review. [] Demonstrates/verbalizes HEP/Ed previously given. Plan: [x] Continue per plan of care.    [] Other:   Frequency:  2x/week for 12 visits        Time In: 1118              Time Out: 1200      Electronically signed by:  Alejandro Felton PTA

## 2020-01-28 ENCOUNTER — HOSPITAL ENCOUNTER (OUTPATIENT)
Dept: PHYSICAL THERAPY | Age: 55
Setting detail: THERAPIES SERIES
Discharge: HOME | End: 2020-01-28
Payer: COMMERCIAL

## 2020-01-28 NOTE — FLOWSHEET NOTE
[x] [facility]) [facility] &  Therapy  [address]    [phone]  F: [phone]   [] [address] [facility] [address]   [address]  P: [phone]  F: [phone]  [] [address]  [address] [facility]  P: [phone]  F: [phone]  [] [address]  [facility]   [address]   [place]: [phone]  F: [phone]   [] [place] [address] [facility] [address]  [place]: [phone]   F: [phone]     Physical Therapy Cancel/No Show note    Date: 2020  Patient: Osbaldo Weaver  : 1965  MRN: [id number]    Cancels/No Shows to date:     For today's appointment patient:    []  Cancelled    [] Rescheduled appointment    [x] No-show      Reason given by patient:    []  Patient ill    []  Conflicting appointment    [] No transportation      [] Conflict with work    [] No reason given    [] Weather related    [] Other:      Comments:        [] Next appointment was confirmed    Electronically signed by: Marlin Szymanski PT

## 2020-01-29 ENCOUNTER — HOSPITAL ENCOUNTER (OUTPATIENT)
Age: 55
Discharge: HOME | End: 2020-01-29
Payer: COMMERCIAL

## 2020-01-29 LAB
ALBUMIN SERPL-MCNC: 4.5 G/DL (ref 3.5–5.2)
ALBUMIN/GLOBULIN RATIO: 1.5 (ref 1–2.5)
ALP BLD-CCNC: 88 U/L (ref 35–104)
ALT SERPL-CCNC: 22 U/L (ref 5–33)
ANION GAP SERPL CALCULATED.3IONS-SCNC: 12 MMOL/L (ref 9–17)
AST SERPL-CCNC: 20 U/L
BILIRUB SERPL-MCNC: 0.36 MG/DL (ref 0.3–1.2)
BUN BLDV-MCNC: 16 MG/DL (ref 6–20)
BUN/CREAT BLD: ABNORMAL (ref 9–20)
CALCIUM SERPL-MCNC: 9.6 MG/DL (ref 8.6–10.4)
CHLORIDE BLD-SCNC: 105 MMOL/L (ref 98–107)
CHOLESTEROL/HDL RATIO: 3.6
CHOLESTEROL: 231 MG/DL
CO2: 24 MMOL/L (ref 20–31)
CREAT SERPL-MCNC: 0.73 MG/DL (ref 0.5–0.9)
ESTIMATED AVERAGE GLUCOSE: 120 MG/DL
GFR AFRICAN AMERICAN: >60 ML/MIN
GFR NON-AFRICAN AMERICAN: >60 ML/MIN
GFR SERPL CREATININE-BSD FRML MDRD: ABNORMAL ML/MIN/{1.73_M2}
GFR SERPL CREATININE-BSD FRML MDRD: ABNORMAL ML/MIN/{1.73_M2}
GLUCOSE BLD-MCNC: 106 MG/DL (ref 70–99)
HBA1C MFR BLD: 5.8 % (ref 4–6)
HDLC SERPL-MCNC: 65 MG/DL
LDL CHOLESTEROL: 143 MG/DL (ref 0–130)
POTASSIUM SERPL-SCNC: 3.9 MMOL/L (ref 3.7–5.3)
SODIUM BLD-SCNC: 141 MMOL/L (ref 135–144)
TOTAL PROTEIN: 7.5 G/DL (ref 6.4–8.3)
TRIGL SERPL-MCNC: 113 MG/DL
VLDLC SERPL CALC-MCNC: ABNORMAL MG/DL (ref 1–30)

## 2020-01-29 PROCEDURE — 80053 COMPREHEN METABOLIC PANEL: CPT

## 2020-01-29 PROCEDURE — 83036 HEMOGLOBIN GLYCOSYLATED A1C: CPT

## 2020-01-29 PROCEDURE — 80061 LIPID PANEL: CPT

## 2020-01-29 PROCEDURE — 36415 COLL VENOUS BLD VENIPUNCTURE: CPT

## 2020-01-30 ENCOUNTER — HOSPITAL ENCOUNTER (OUTPATIENT)
Dept: PHYSICAL THERAPY | Age: 55
Setting detail: THERAPIES SERIES
Discharge: HOME | End: 2020-01-30
Payer: COMMERCIAL

## 2020-01-30 PROCEDURE — 97110 THERAPEUTIC EXERCISES: CPT

## 2020-01-30 NOTE — FLOWSHEET NOTE
[x] [facility] &  Therapy  [address]  [phone]  F: [phone]     Physical Therapy Daily Treatment Note    Date:  2020  Patient Name:  Khoi Paul      :  1965    MRN: [id number]  Physician: Tavo Chester DO/ Juan Guadarrama DO       Insurance: [employer] 30 v  Medical Diagnosis: Bilateral knee OA M17.0                        Rehab Codes: E99.764, M25.561, M25.661, M25.662  Onset Date:                                    Next 's appt: 20 w/Mariela Marrero     Visit# / total visits:    Cancels/No Shows: 0/0  (1 visit used )    Subjective:    Pain:  [x] Yes  [] No Location: B knees   Pain Rating: (0-10 scale) 8/10 grossly   Pain altered Tx:  [] No  [x] Yes  Action: limited exercise program  Comments: patient arrives with increased reports of pain in B knees, back and L shoulder this date. Notes she was going to cancel, but was agreeable to complete a modified program this date.      Objective:  Modalities: Large HP to B knees in supine at end of Tx 15 min    Precautions: monitor HR/BP, suspected cardiovascular involvement - fatigues quickly     Exercises Completed on 20 marked with \"X\"  Exercise Reps/ Time Weight/ Level Comments    SciFit 5 min Level 4  X          Standing       Gastroc Stretch on Wedge 3x30\"  Added 2020 X   Heel Raises 2x10  Added 1/9/2020 X   3 - Way Hip 15x ea  Increased reps 20 2x10 ea      HS Curls 2x10 ea      Step Up 10x ea 6 inch     Step Down 10x ea 4 inch     Mini Squats 10x                    Seated       Hamstring Stretch on Step Stool  3x20\" ea   X   LAQ 15x ea   X   HS Curls 15x ea [place]  X          Supine       Quadriceps Isometric 10x5\" ea   X   Hamstring Isometric 10x5\" ea      Hip Adduction Isometric 15x5\"   X          SLR 15x ea  In sets as needed due to fatigue X   Heel Slides 15x ea   X   Bridge 2x10   X   SAQ 15x ea             Other: **HR monitor worn throughout Tx 1/16/2020, 1/21/2020  - max  BPM**    Specific Instructions for next treatment:  Bilateral knee and hip strengthening; progress slowly      Treatment Charges: Mins Units   [x]  HP     []  Ther Exercise 30 2   []  Manual Therapy     []  Ther Activities     []  Aquatics     []  Vasocompression     []  Other     Total Treatment time 30 2       Assessment: [x] Progressing toward goals: some ex's held again this date due to increased reports of pain. Pt again educated on importance of mobility and strengthening ex's to improve her overall function and their benefits as they relate to her B knee OA. Patient states she is going to schedule an appt with Ortho nonetheless, and wants her pain to improve. [] No change. [] Other:          STG: (to be met in 8 treatments)  1. ? Pain: 4/10 bilateral knee pain with standing activity   2. ? ROM:Bilateral knee flexion 0-130 degrees to improve squatting and stair climbing. 3. ? Strength: 5/5 bilateral knee and hip strength to improve joint stability. 4. ? Function: LEFS score to 50% functional    5. Independent with Home Exercise Programs     LTG: (to be met in 12 treatments)  1. Patient will report decreased incidence of knee swelling. 2. Patient will be able to stand/work for 8 hours.         Patient goals: Reduce pain, improve standing tolerance, reduce swelling.        Pt. Education:  [x] Yes  [] No  [x] Reviewed Prior HEP/Ed  Method of Education: [x] Verbal  [x] Demo  [] Written  Comprehension of Education:  [x] Verbalizes understanding. [] Demonstrates understanding. [x] Needs review. [] Demonstrates/verbalizes HEP/Ed previously given. Plan: [x] Continue per plan of care.    [] Other:   Frequency:  2x/week for 12 visits        Time In: 1120              Time Out: 1210      Electronically signed by:  Odette Gupta PTA

## 2020-02-03 ENCOUNTER — TELEPHONE (OUTPATIENT)
Dept: GASTROENTEROLOGY | Age: 55
End: 2020-02-03

## 2020-02-03 NOTE — TELEPHONE ENCOUNTER
Contacted Liudmila to discuss scheduling colonoscopy. She states she has had a colonoscopy in the last 10 years but doesn't remember the date. Informed Saintclair Reese she can be scheduled for colonoscopy but she should check with insurance to find out date of last colonoscopy to make sure they will cover it at this time. She is calling [facility] and will call back to schedule. NP questionnaire scanned.  1st attempt

## 2020-02-05 ENCOUNTER — HOSPITAL ENCOUNTER (OUTPATIENT)
Age: 55
Discharge: HOME | End: 2020-02-05
Payer: COMMERCIAL

## 2020-02-05 ENCOUNTER — OFFICE VISIT (OUTPATIENT)
Dept: ORTHOPEDIC SURGERY | Age: 55
End: 2020-02-05
Payer: COMMERCIAL

## 2020-02-05 VITALS — BODY MASS INDEX: 31.82 KG/M2 | HEIGHT: 65 IN | WEIGHT: 191 LBS

## 2020-02-05 LAB
C-REACTIVE PROTEIN: 0.8 MG/L (ref 0–5)
RHEUMATOID FACTOR: <10 IU/ML
SEDIMENTATION RATE, ERYTHROCYTE: 10 MM (ref 0–20)

## 2020-02-05 PROCEDURE — 36415 COLL VENOUS BLD VENIPUNCTURE: CPT

## 2020-02-05 PROCEDURE — 86140 C-REACTIVE PROTEIN: CPT

## 2020-02-05 PROCEDURE — 99213 OFFICE O/P EST LOW 20 MIN: CPT | Performed by: STUDENT IN AN ORGANIZED HEALTH CARE EDUCATION/TRAINING PROGRAM

## 2020-02-05 PROCEDURE — 85651 RBC SED RATE NONAUTOMATED: CPT

## 2020-02-05 PROCEDURE — 86200 CCP ANTIBODY: CPT

## 2020-02-05 PROCEDURE — 86431 RHEUMATOID FACTOR QUANT: CPT

## 2020-02-05 RX ORDER — METHYLPREDNISOLONE 4 MG/1
TABLET ORAL
Qty: 1 KIT | Refills: 0 | Status: SHIPPED | OUTPATIENT
Start: 2020-02-05 | End: 2020-02-11

## 2020-02-06 ENCOUNTER — HOSPITAL ENCOUNTER (OUTPATIENT)
Dept: PHYSICAL THERAPY | Age: 55
Setting detail: THERAPIES SERIES
Discharge: HOME | End: 2020-02-06
Payer: COMMERCIAL

## 2020-02-06 LAB — CCP IGG ANTIBODIES: <1.5 U/ML

## 2020-02-06 PROCEDURE — 97110 THERAPEUTIC EXERCISES: CPT

## 2020-02-10 NOTE — PROGRESS NOTES
providing my signature indicating agreement. Objective :   Ht 5' 5\" (1.651 m)   Wt 191 lb (86.6 kg)   BMI 31.78 kg/m²   General: AAOx3, NAD, appears stated age  Ortho Exam  MSK: Mild globalized TTP about both knees with no discretely palpable area of maximal tenderness. AROM 0-90 bilaterally. Stable to varus and valgus stressing. Mild bilateral effusions. Additionally patient has pain with AROM bilateral shoulders, hips, wrists, and hands. Full AROM cervical spine with no pain. CV: no obvious JVD, no dependent edema, distal pulses 2+  Respiratory: chest rise symmetric, unlabored respirations, no audible wheezing  Skin: warm, well perfused, no obvious rashes or lesions  Psych: Patient displays understanding of exam, diagnosis, and plan. Radiology:   No new imaging obtained today, prior knee x-rays demonstrate moderate tricompartmental DJD to both knees. Assessment:      1. Polyarthralgia         Plan:      -Given some, though not very significant improvement, with therapy and anti-inflammatories recommend continuing these measures for now. As patient now complains of widespread joint pains with unclear family history will proceed with screening inflammatory arthropathy work-up.  -Also will provide Medrol dose tiffani to help alleviate symptoms in the interim. Resume Voltaren after completion of steroids. Continue PT.  -Follow up in clinic to review response to Medrol dosepak and results of labs. Follow up:Return in about 3 weeks (around 2/26/2020). Orders Placed This Encounter   Medications    methylPREDNISolone (MEDROL DOSEPACK) 4 MG tablet     Sig: Take by mouth.      Dispense:  1 kit     Refill:  0          Orders Placed This Encounter   Procedures    Sedimentation Rate     Standing Status:   Future     Number of Occurrences:   1     Standing Expiration Date:   2/5/2021    C-reactive protein    Rheumatoid Factor     Standing Status:   Future     Number of Occurrences:   1     Standing Expiration Date:   8/3/4631    Cyclic Citrul Peptide Antibody, IgG     Standing Status:   Future     Number of Occurrences:   1     Standing Expiration Date:   2/5/2021       Electronically signed by Deedee Hodges DO on 2/10/2020 at 6:49 PM

## 2020-02-12 ENCOUNTER — OFFICE VISIT (OUTPATIENT)
Dept: PODIATRY | Age: 55
End: 2020-02-12
Payer: COMMERCIAL

## 2020-02-12 VITALS
SYSTOLIC BLOOD PRESSURE: 120 MMHG | DIASTOLIC BLOOD PRESSURE: 87 MMHG | HEART RATE: 77 BPM | HEIGHT: 65 IN | BODY MASS INDEX: 31.82 KG/M2 | WEIGHT: 191 LBS

## 2020-02-12 PROCEDURE — G8427 DOCREV CUR MEDS BY ELIG CLIN: HCPCS | Performed by: STUDENT IN AN ORGANIZED HEALTH CARE EDUCATION/TRAINING PROGRAM

## 2020-02-12 PROCEDURE — 99202 OFFICE O/P NEW SF 15 MIN: CPT | Performed by: STUDENT IN AN ORGANIZED HEALTH CARE EDUCATION/TRAINING PROGRAM

## 2020-02-12 PROCEDURE — 3017F COLORECTAL CA SCREEN DOC REV: CPT | Performed by: STUDENT IN AN ORGANIZED HEALTH CARE EDUCATION/TRAINING PROGRAM

## 2020-02-12 PROCEDURE — 11721 DEBRIDE NAIL 6 OR MORE: CPT | Performed by: STUDENT IN AN ORGANIZED HEALTH CARE EDUCATION/TRAINING PROGRAM

## 2020-02-12 PROCEDURE — 2022F DILAT RTA XM EVC RTNOPTHY: CPT | Performed by: STUDENT IN AN ORGANIZED HEALTH CARE EDUCATION/TRAINING PROGRAM

## 2020-02-12 PROCEDURE — 3044F HG A1C LEVEL LT 7.0%: CPT | Performed by: STUDENT IN AN ORGANIZED HEALTH CARE EDUCATION/TRAINING PROGRAM

## 2020-02-12 PROCEDURE — 99203 OFFICE O/P NEW LOW 30 MIN: CPT | Performed by: STUDENT IN AN ORGANIZED HEALTH CARE EDUCATION/TRAINING PROGRAM

## 2020-02-12 PROCEDURE — 1036F TOBACCO NON-USER: CPT | Performed by: STUDENT IN AN ORGANIZED HEALTH CARE EDUCATION/TRAINING PROGRAM

## 2020-02-12 PROCEDURE — G8417 CALC BMI ABV UP PARAM F/U: HCPCS | Performed by: STUDENT IN AN ORGANIZED HEALTH CARE EDUCATION/TRAINING PROGRAM

## 2020-02-12 PROCEDURE — G8484 FLU IMMUNIZE NO ADMIN: HCPCS | Performed by: STUDENT IN AN ORGANIZED HEALTH CARE EDUCATION/TRAINING PROGRAM

## 2020-02-12 NOTE — PROGRESS NOTES
[address] [place] [address] [place]  Tel: [phone]   Fax: [phone]    Subjective     CC: Painful and elongated toe nails    HPI:  Gloria Croft is a 62y.o. year old female who presents to clinic today for diabetic foot examination and also complaining of painful and elongated toenails. The patient states she is a diabetic, and her last HgbA1c was 5.8% on 1/29/2020. She takes Metformin for the DM. The patient states their digits are painful when the toenails are elongated, causing rubbing in shoe gear. The patient admits to tingling and numbness in the bilateral plantar feet when she is on them for long periods of time, but resolved once she sits. She admits to OA affecting the knees, shoulders, and lower back. Patient denies any rest pain or claudication symptoms. The patient states she used to work at [employer] and has dropped a package on her right foot. She has seen Dr. Qi Cuevas in the past who gave her Terbinafin which she states cleared up her onychomycosis. The patient denies any other pedal complaints. Primary care physician is ADRIÁN Kern CNP.    ROS:    Constitutional: Denies nausea, vomiting, fever, chills. Neurologic: Admits numbness, tingling, and burning in the feet. Vascular: Denies symptoms of lower extremity claudication. Skin: Denies open wounds. Otherwise negative except as noted in the HPI.      PMH:  Past Medical History:   Diagnosis Date    Alcohol use disorder, mild, abuse     Allergic rhinitis 4/23/2015    Anxiety 3/22/2016    Asthma     Depression 3/22/2016    Fibroid, uterus     GERD (gastroesophageal reflux disease)     Restless leg 3/22/2016    Ulcer        Surgical History:   Past Surgical History:   Procedure Laterality Date    HYSTERECTOMY  2002    fibroids    KNEE SURGERY      rt knee baker cyst    ROTATOR CUFF REPAIR Right     TONSILLECTOMY AND ADENOIDECTOMY      TUBAL LIGATION  1989       Social History:  Social History     Tobacco Use    Smoking status: Former Smoker     Packs/day: 0.25     Last attempt to quit: 2018     Years since quittin.0    Smokeless tobacco: Never Used    Tobacco comment: Pt is trying to cut back cigarettes on her  own. Substance Use Topics    Alcohol use: Yes     Alcohol/week: 0.0 standard drinks     Comment: patient has been drinking 1 or 2 glasses of nick per day over the wk ends in the past. Last use---2 beers 4 days ago. [place] Drug use: Yes     Types: Marijuana     Comment: patient has abused crack cocaine from her early 25s until 20years of age over the weekends. Since very early , she has been smoking marijuana over the weekends probably 2 times a week and the last use was 4  days ago. She smokes 1 joint of MJ /day       Medications:  Prior to Admission medications    Medication Sig Start Date End Date Taking?  Authorizing Provider   diclofenac (VOLTAREN) 50 MG EC tablet Take 1 tablet by mouth 3 times daily (with meals) 19  Yes Arianna Brownlee DO   ibuprofen (IBU) 800 MG tablet Take 1 tablet by mouth every 6 hours as needed for Pain 19  Yes Saul Aden MD   acetaminophen (TYLENOL) 325 MG tablet Take 2 tablets by mouth every 6 hours as needed for Pain 10/31/19  Yes Lorenzo Mckeon MD   fluconazole (DIFLUCAN) 150 MG tablet TAKE 1 TABLET BY MOUTH FOR ONE DOSE 17  Yes Halina Atkinson MD   albuterol (PROVENTIL) (2.5 MG/3ML) 0.083% nebulizer solution USE 1 VIAL IN AEROSOL EVERY 6 HOURS AS NEEDED 17  Yes Halina Atkinson MD   loratadine (CLARITIN) 10 MG tablet TAKE 1 TABLET ONCE DAILY 17  Yes Halina Atkinson MD   nicotine (NICODERM CQ) 14 MG/24HR Place 1 patch onto the skin every 24 hours 8/3/16  Yes Halina Atkinson MD   sertraline (ZOLOFT) 100 MG tablet Take 2 tabs daily---Increase 16  Yes Sandra Hubbard MD   traZODone (DESYREL) 50 MG tablet Take 1 tablet by mouth nightly --Has supply 16  Yes Sandra Hubbard analysis: No early heel off, no abductory twist.    Assessment   Liudmila Tesfaye is a 62 y.o. female with     Diagnosis Orders   1. Onychomycosis  29455 - MS DEBRIDEMENT OF NAILS, 6 OR MORE   2. Controlled type 2 diabetes mellitus without complication, without long-term current use of insulin ([facility])  72402 - MS DEBRIDEMENT OF NAILS, 6 OR MORE   3. Hammer toe of left foot        Q9 findings  Plan   · Patient examined and evaluated. · Diagnosis and treatment options discussed in detail. · Nails 1-10 debrided in length and thickness sharply with sterile nail nippers without incident q9 findings. · Discussed her numbness and tingling symptoms likely from her arthritis stemming from her back. Instructed patient to discuss with ortho. Instructed patient to continue taking diclofenac from ortho. · Patient was educated on the utmost importance of tight glycemic control. · Patient was advised to continue daily foot checks, in addition to not ambulating barefoot. · Patient to RTC in 3 months. · Please, call the office with any questions or concerns     No orders of the defined types were placed in this encounter.     Orders Placed This Encounter   Procedures    95050 - MS DEBRIDEMENT OF NAILS, [address]   Podiatric Medicine & Surgery   2/12/2020 at 2:26 PM

## 2020-02-13 ENCOUNTER — HOSPITAL ENCOUNTER (OUTPATIENT)
Dept: PHYSICAL THERAPY | Age: 55
Setting detail: THERAPIES SERIES
Discharge: HOME | End: 2020-02-13
Payer: COMMERCIAL

## 2020-02-13 NOTE — FLOWSHEET NOTE
[x] [facility]) [facility] &  Therapy  [address]    [phone]  F: [phone]   [] [address] [facility] [address]   [address]  P: [phone]  F: [phone]  [] [place]  [address] [facility]  P: [phone]  F: [phone]  [] [address]  [facility]   [address]   [place]: [phone]  F: [phone]   [] [address]  [place]: [phone]   F: [phone]     Physical Therapy Cancel/No Show note    Date: 2020  Patient: Sonal Israel  : 1965  MRN: [id number]    Cancels/No Shows to date:     For today's appointment patient:    [x]  Cancelled    [] Rescheduled appointment    [] No-show     Reason given by patient:    [x]  Patient ill    []  Conflicting appointment    [] No transportation      [] Conflict with work    [] No reason given    [] Weather related    [] Other:      Comments: Will discharge if patient does not reschedule this week's appointments.        [] Next appointment was confirmed    Electronically signed by: Carmen Burgess PT

## 2020-02-20 ENCOUNTER — HOSPITAL ENCOUNTER (OUTPATIENT)
Dept: PHYSICAL THERAPY | Age: 55
Setting detail: THERAPIES SERIES
Discharge: HOME | End: 2020-02-20
Payer: COMMERCIAL

## 2020-02-20 PROCEDURE — 97110 THERAPEUTIC EXERCISES: CPT

## 2020-02-20 NOTE — PROGRESS NOTES
Bilateral knee and hip strengthening; progress slowly      Treatment Charges: Mins Units   [x]  HP     []  Ther Exercise 45 3   []  Manual Therapy     []  Ther Activities     []  Aquatics     []  Vasocompression     []  Other     Total Treatment time 45 3       Assessment: [x] Progressing toward goals: Patient still lacks adequate L  knee and hip strength. R Knee ROM and strength is adequate. Her left knee is much more limiting than her right. Patient still presents with endurance issues that limit her participation in PT and lacks compliance to her HEP. These issues have delayed therapeutic results. Recommend patient to complete her final 2 PT sessions and commit to completing her HEP 3x/week going forward to improve her knee dysfunction. She should also continue to f/u with orthopedics to discuss other forms of treatment. [] No change. [] Other:      ROM: R knee flexion 0-120, L knee flexion 0-130  MMT: R knee 5/5 flexion and extension, 4+/5 left knee flexion and extension. LEFS: 28% functional    STG: (to be met in 8 treatments)  1. ? Pain: 4/10 bilateral knee pain with standing activity Not Met  2. ? ROM:Bilateral knee flexion 0-130 degrees to improve squatting and stair climbing. Progress   3. ? Strength: 5/5 bilateral knee and hip strength to improve joint stability. Progress  4. ? Function: LEFS score to 50% functional  Not Met  5. Independent with Home Exercise Programs Not Met     LTG: (to be met in 12 treatments)  1. Patient will report decreased incidence of knee swelling. Not Met   2. Patient will be able to [facility] for 8 hours. 3-4 hrs max         Patient goals: Reduce pain, improve standing tolerance, reduce swelling.        Pt. Education:  [x] Yes  [] No  [x] Reviewed Prior HEP/Ed  Method of Education: [x] Verbal  [x] Demo  [] Written  Comprehension of Education:  [x] Verbalizes understanding. [] Demonstrates understanding. [x] Needs review.   [] Demonstrates/verbalizes HEP/Ed previously given.     Plan: [x] Continue per plan of care.    [] Other:   Frequency:  2x/week for 12 visits        Time In: 1055              Time Out: 1140      Electronically signed by:  Irving Chaudhari PT

## 2020-02-24 NOTE — TELEPHONE ENCOUNTER
Tried to contact patient, mobile number is busy, home number no answer, voicemail is full, unable to leave a message   2nd attempt

## 2020-02-27 ENCOUNTER — HOSPITAL ENCOUNTER (OUTPATIENT)
Dept: PHYSICAL THERAPY | Age: 55
Setting detail: THERAPIES SERIES
Discharge: HOME | End: 2020-02-27
Payer: COMMERCIAL

## 2020-02-27 NOTE — FLOWSHEET NOTE
[x] [facility]) [facility] &  Therapy  [address]    [phone]  F: [phone]   [] [address]  [facility]   [address]  P: [phone]  F: [phone]  [] [place]  [address] [facility]  P: [phone]  F: [phone]  [] [address]  [address] [facility] [address]   [address]   [place]: [phone]  F: [phone]   [] [facility]  [address] [facility] [address]  [place]: [phone]   F: [phone]     Physical Therapy Cancel/No Show note    Date: 2020  Patient: Rayray Ramsey  : 1965  MRN: [id number]    Cancels/No Shows to date:     For today's appointment patient:    []  Cancelled    [] Rescheduled appointment    [x] No-show     Reason given by patient:    []  Patient ill    []  Conflicting appointment    [] No transportation      [] Conflict with work    [] No reason given    [] Weather related    [] Other:      Comments: Will discharge due to attendance policy.          [] Next appointment was confirmed    Electronically signed by: Shahab Zuleta, PT

## 2020-03-10 NOTE — DISCHARGE SUMMARY
[x] [facility] &  Therapy  [address]  [phone]  F: [phone] [] [address]  [place] [address]   [address]  P: [phone]  F: [phone] [] [address]  [address] [facility]  P: [phone]  F: [phone] [] [address]  [address] [facility]   [address]   [place]: [phone]  F: [phone]      Physical Therapy Discharge Note    Date: 3/10/2020      Patient: Kevin Burton  : 1965  MRN: [id number]    Physician: Yahir Ann DO/ Yenni Mcnair        Insurance: [email or web address]  Medical Diagnosis: Bilateral knee OA M17.0                        Rehab Codes: M25.562, M25.561, M25.661, M25.662  Onset Date:                                           Next 's appt:   Visit# / total visits: 10/12                              Cancels/No Shows:   (1 visit used )      Subjective:    Pain:  [x]? Yes  []? No   Location: L knee           Pain Rating: (0-10 scale) 6/10 grossly   Pain altered Tx:  [x]? No  []? Yes  Action:   Comments: CINDY, Patient missed final 2 appointments. Assessment:  [x]? Progressing toward goals: Patient still lacks adequate L  knee and hip strength. R Knee ROM and strength is adequate. Her left knee is much more limiting than her right. Patient still presents with endurance issues that limit her participation in PT and lacks compliance to her HEP. These issues have delayed therapeutic results. Recommend patient to complete her final 2 PT sessions and commit to completing her HEP 3x/week going forward to improve her knee dysfunction. She should also continue to f/u with orthopedics to discuss other forms of treatment. []? No change.                           []? Other:       ROM: R knee flexion 0-120, L knee flexion 0-130  MMT: R knee 5/5 flexion and extension, 4+/5 left knee flexion and extension. LEFS: 28% functional     STG: (to be met in 8 treatments)  1. ? Pain: 4/10 bilateral knee pain with standing activity Not Met  2. ? ROM:Bilateral knee flexion 0-130 degrees to improve squatting and stair climbing. Progress   3. ? Strength: 5/5 bilateral knee and hip strength to improve joint stability. Progress  4. ? Function: LEFS score to 50% functional  Not Met  5. Independent with Home Exercise Programs Not Met     LTG: (to be met in 12 treatments)  1. Patient will report decreased incidence of knee swelling. Not Met   2. Patient will be able to stand/work for 8 hours. 3-4 hrs max        Treatment to Date:  [x] Therapeutic Exercise    [] Modalities:  [] Therapeutic Activity     [] Ultrasound  [] Electrical Stimulation  [] Gait Training     [] Massage       [] Lumbar/Cervical Traction  [] Neuromuscular Re-education [] Cold/hotpack [] Iontophoresis: 4 mg/mL  [] Instruction in Home Exercise Program                     Dexamethasone Sodium  [] Manual Therapy             Phosphate 40-80 mAmin  [] Aquatic Therapy                   [] Vasocompression/    [] Other:             Game Ready    Discharge Status:     [] Pt recovered from conditions. Treatment goals were met. [] Pt received maximum benefit. No further therapy indicated at this time. [x] Pt to continue exercise/home instructions independently. [] Therapy interrupted due to:    [] Pt has 2 or more no shows/cancels, is discontinued per our policy. [] Pt has completed prescribed number of treatment sessions. [] Other:         Electronically signed by Hannah Mahoney PT on 3/10/2020 at 7:36 AM      If you have any questions or concerns, please don't hesitate to call.   Thank you for your referral.

## 2020-10-09 ENCOUNTER — HOSPITAL ENCOUNTER (OUTPATIENT)
Age: 55
Discharge: HOME | End: 2020-10-09
Payer: COMMERCIAL

## 2020-10-09 LAB
ALBUMIN SERPL-MCNC: 4.7 G/DL (ref 3.5–5.2)
ALBUMIN/GLOBULIN RATIO: 1.7 (ref 1–2.5)
ALP BLD-CCNC: 90 U/L (ref 35–104)
ALT SERPL-CCNC: 28 U/L (ref 5–33)
ANION GAP SERPL CALCULATED.3IONS-SCNC: 14 MMOL/L (ref 9–17)
AST SERPL-CCNC: 21 U/L
BILIRUB SERPL-MCNC: 0.28 MG/DL (ref 0.3–1.2)
BUN BLDV-MCNC: 12 MG/DL (ref 6–20)
BUN/CREAT BLD: ABNORMAL (ref 9–20)
CALCIUM SERPL-MCNC: 9.6 MG/DL (ref 8.6–10.4)
CHLORIDE BLD-SCNC: 104 MMOL/L (ref 98–107)
CHOLESTEROL, FASTING: 208 MG/DL
CHOLESTEROL/HDL RATIO: 3.6
CO2: 22 MMOL/L (ref 20–31)
CREAT SERPL-MCNC: 0.75 MG/DL (ref 0.5–0.9)
CREATININE URINE: 147.3 MG/DL (ref 28–217)
ESTIMATED AVERAGE GLUCOSE: 131 MG/DL
GFR AFRICAN AMERICAN: >60 ML/MIN
GFR NON-AFRICAN AMERICAN: >60 ML/MIN
GFR SERPL CREATININE-BSD FRML MDRD: ABNORMAL ML/MIN/{1.73_M2}
GFR SERPL CREATININE-BSD FRML MDRD: ABNORMAL ML/MIN/{1.73_M2}
GLUCOSE BLD-MCNC: 117 MG/DL (ref 70–99)
HBA1C MFR BLD: 6.2 % (ref 4–6)
HDLC SERPL-MCNC: 57 MG/DL
LDL CHOLESTEROL: 127 MG/DL (ref 0–130)
MICROALBUMIN/CREAT 24H UR: <12 MG/L
MICROALBUMIN/CREAT UR-RTO: NORMAL MCG/MG CREAT
POTASSIUM SERPL-SCNC: 3.9 MMOL/L (ref 3.7–5.3)
SODIUM BLD-SCNC: 140 MMOL/L (ref 135–144)
TOTAL PROTEIN: 7.5 G/DL (ref 6.4–8.3)
TRIGLYCERIDE, FASTING: 120 MG/DL
VITAMIN D 25-HYDROXY: 22.4 NG/ML (ref 30–100)
VLDLC SERPL CALC-MCNC: ABNORMAL MG/DL (ref 1–30)

## 2020-10-09 PROCEDURE — 80061 LIPID PANEL: CPT

## 2020-10-09 PROCEDURE — 36415 COLL VENOUS BLD VENIPUNCTURE: CPT

## 2020-10-09 PROCEDURE — 82043 UR ALBUMIN QUANTITATIVE: CPT

## 2020-10-09 PROCEDURE — 80053 COMPREHEN METABOLIC PANEL: CPT

## 2020-10-09 PROCEDURE — 82306 VITAMIN D 25 HYDROXY: CPT

## 2020-10-09 PROCEDURE — 82570 ASSAY OF URINE CREATININE: CPT

## 2020-10-09 PROCEDURE — 83036 HEMOGLOBIN GLYCOSYLATED A1C: CPT

## 2021-03-30 DIAGNOSIS — M25.561 PAIN IN BOTH KNEES, UNSPECIFIED CHRONICITY: Primary | ICD-10-CM

## 2021-03-30 DIAGNOSIS — M25.562 PAIN IN BOTH KNEES, UNSPECIFIED CHRONICITY: Primary | ICD-10-CM

## 2021-04-01 ENCOUNTER — OFFICE VISIT (OUTPATIENT)
Dept: ORTHOPEDIC SURGERY | Age: 56
End: 2021-04-01
Payer: COMMERCIAL

## 2021-04-01 VITALS — BODY MASS INDEX: 31.65 KG/M2 | WEIGHT: 190 LBS | HEIGHT: 65 IN

## 2021-04-01 DIAGNOSIS — E55.9 VITAMIN D DEFICIENCY: ICD-10-CM

## 2021-04-01 DIAGNOSIS — S93.402A SPRAIN OF LEFT ANKLE, UNSPECIFIED LIGAMENT, INITIAL ENCOUNTER: ICD-10-CM

## 2021-04-01 DIAGNOSIS — M17.0 BILATERAL PRIMARY OSTEOARTHRITIS OF KNEE: ICD-10-CM

## 2021-04-01 DIAGNOSIS — M25.572 LEFT ANKLE PAIN, UNSPECIFIED CHRONICITY: Primary | ICD-10-CM

## 2021-04-01 PROCEDURE — 3017F COLORECTAL CA SCREEN DOC REV: CPT | Performed by: STUDENT IN AN ORGANIZED HEALTH CARE EDUCATION/TRAINING PROGRAM

## 2021-04-01 PROCEDURE — G8427 DOCREV CUR MEDS BY ELIG CLIN: HCPCS | Performed by: STUDENT IN AN ORGANIZED HEALTH CARE EDUCATION/TRAINING PROGRAM

## 2021-04-01 PROCEDURE — 1036F TOBACCO NON-USER: CPT | Performed by: STUDENT IN AN ORGANIZED HEALTH CARE EDUCATION/TRAINING PROGRAM

## 2021-04-01 PROCEDURE — 99214 OFFICE O/P EST MOD 30 MIN: CPT | Performed by: STUDENT IN AN ORGANIZED HEALTH CARE EDUCATION/TRAINING PROGRAM

## 2021-04-01 PROCEDURE — G8419 CALC BMI OUT NRM PARAM NOF/U: HCPCS | Performed by: STUDENT IN AN ORGANIZED HEALTH CARE EDUCATION/TRAINING PROGRAM

## 2021-04-01 RX ORDER — ERGOCALCIFEROL 1.25 MG/1
50000 CAPSULE ORAL WEEKLY
Qty: 8 CAPSULE | Refills: 0 | Status: SHIPPED | OUTPATIENT
Start: 2021-04-01 | End: 2022-05-25

## 2021-04-01 NOTE — PROGRESS NOTES
Orthopedic Clinic Progress Note    Subjective: Pt is a 60 y.o. female being seen for bilateral knee pain and left ankle pain. Patient was previously seen in our clinic on 02/10/2020 where there was concern for possible inflammatory arthritis pathology and patient was sent for further work-up. Patient's lab failed to demonstrate any inflammatory changes. Patient states she is unable to work because of her bilateral knee pain. Patient states all the job she is ever had the required to be on her feet and her knees limit her ability to work. Patient states over the past 2 weeks he is also developed left ankle pain. She also states that she also has bilateral hand swelling and pain with hand motion as well. Denies any new injuries. Denies any numbness or tingling. Patient did have an injection to her bilateral knees in December 2019 that she states helped for about a month. Patient very sedentary lifestyle. States she does not like to exercise or workout because she states all of her joints hurt. Patient has not been compliant with her diuretic medication and has not seen her PCP in over a year and a half. Constitutional: Negative for fever and chills. HENT: Negative for congestion or drainage   Eyes: Negative for blurred vision and double vision. Respiratory: Negative for cough, shortness of breath and wheezing. Cardiovascular: Negative for chest pain and palpitations. Gastrointestinal: Negative for nausea. Negative for vomiting. Musculoskeletal: Positive for myalgias and joint pain. Skin: Negative for itching and rash. Neurological: Negative for dizziness, sensory change and headaches. Psychiatric/Behavioral: Negative for depression and suicidal ideas. Objective: There were no vitals filed for this visit.   Gen: NAD, cooperative  Cardiovascular: Regular rate, no dependent edema, distal pulses 2+  Respiratory: Chest symmetric, no accessory muscle use, normal respirations LLE: Mild swelling and mild joint effusion about left knee. Mild lateral ankle swelling. Left ankle stable to anterior drawer and plantar flexion and dorsiflexion. Tenderness palpation about lateral malleolus. Active range of motion left knee 0-140 without pain. Tenderness palpation about medial lateral joint line. (-) Varus/Valgus Laxity with Stress. Negative logroll. (-) FADIR/BARBRAA. Leg warm well perfused. Sensation intact to light touch throughout lower extremity. RLE: No deformities or swelling about right knee. Active range of motion right knee 0-140 without pain. No joint line tenderness to palpation. (-) Varus/Valgus Laxity with Stress. Negative logroll. (-) FADIR/BARBARA. Leg warm well perfused. Sensation intact to light touch throughout lower extremity. Radiographs:  History:   59 yo female with right knee pain    Findings:   Views: 4V  Right Knee (AP/Lateral/Notch/Merchant)  Weight bearing: Yes   Findings: Tricompartmental degenerative joint disease with mild medial and lateral joint space narrowing. Consistent with Kellgren grade II changes (subchondral sclerosis, Joint space narrowing, osteophytes). Sharpening of tibial spines. Mild advancement when compared to prior films. No acute abnormality. Previous comparison films December 23, 2019    Impression:  Degenerative Changes right knee without acute osseous abnormality     History:   59 yo female with left knee pain    Findings:   Views: 4V  Left Knee (AP/Lateral/Notch/Merchant)  Weight bearing: Yes   Findings: Tricompartmental degenerative joint disease with moderate medial and lateral joint space narrowing. Consistent with Kellgren grade III changes (subchondral sclerosis, Joint space narrowing, osteophytes). Sharpening of tibial spines. Osteophytic changes about proximal tibiofibular joint. Mild advancement when compared to prior films. No acute abnormality.   Previous comparison films December 23, 2019    Impression: secondary to ankle sprain recommend ice and elevation as well as NSAID use. Instructed patient may return to normal activities as ankle pain allows. Prior labs were reviewed and demonstrated vitamin D deficiency. Instructed patient to take 50,000 units of vitamin D weekly for 8 weeks. Prescription provided. Patient then may proceed with daily vitamin D supplementation when 8-week supply is ended. All questions answered. Patient amenable to this plan. Shirley Mir DO  Orthopedic Surgery Resident  [address]

## 2021-04-08 NOTE — PROGRESS NOTES
I performed a history and physical examination of the patient and discussed management with the resident. I reviewed the physician assistant/resident physician note and agree with the documented findings and plan of care. Any areas of disagreement are noted on the chart. I have personally evaluated this patient and have completed at least one if not all key elements of the E/M (history, physical exam, and MDM). Additional findings are as noted. I agree with the chief complaint, past medical history, past surgical history, allergies, medications, social and family history as documented unless otherwise noted below.      Electronically signed by Kapil Lopez DO on 4/8/2021 at 7:28 AM

## 2021-05-05 ENCOUNTER — OFFICE VISIT (OUTPATIENT)
Dept: PRIMARY CARE CLINIC | Age: 56
End: 2021-05-05
Payer: COMMERCIAL

## 2021-05-05 VITALS
DIASTOLIC BLOOD PRESSURE: 89 MMHG | OXYGEN SATURATION: 98 % | HEART RATE: 83 BPM | BODY MASS INDEX: 31.38 KG/M2 | TEMPERATURE: 98 F | WEIGHT: 188.6 LBS | SYSTOLIC BLOOD PRESSURE: 127 MMHG

## 2021-05-05 DIAGNOSIS — Z12.11 SCREENING FOR MALIGNANT NEOPLASM OF COLON: ICD-10-CM

## 2021-05-05 DIAGNOSIS — R73.03 PRE-DIABETES: ICD-10-CM

## 2021-05-05 DIAGNOSIS — M25.471 ANKLE EDEMA, BILATERAL: ICD-10-CM

## 2021-05-05 DIAGNOSIS — I10 ESSENTIAL HYPERTENSION: Primary | ICD-10-CM

## 2021-05-05 DIAGNOSIS — Z76.0 MEDICATION REFILL: ICD-10-CM

## 2021-05-05 DIAGNOSIS — M25.472 ANKLE EDEMA, BILATERAL: ICD-10-CM

## 2021-05-05 DIAGNOSIS — Z12.31 ENCOUNTER FOR SCREENING MAMMOGRAM FOR BREAST CANCER: ICD-10-CM

## 2021-05-05 PROBLEM — M25.50 MULTIPLE JOINT PAIN: Status: ACTIVE | Noted: 2021-05-05

## 2021-05-05 LAB — HBA1C MFR BLD: 6.1 %

## 2021-05-05 PROCEDURE — 83036 HEMOGLOBIN GLYCOSYLATED A1C: CPT | Performed by: NURSE PRACTITIONER

## 2021-05-05 PROCEDURE — G8417 CALC BMI ABV UP PARAM F/U: HCPCS | Performed by: NURSE PRACTITIONER

## 2021-05-05 PROCEDURE — 1036F TOBACCO NON-USER: CPT | Performed by: NURSE PRACTITIONER

## 2021-05-05 PROCEDURE — G8427 DOCREV CUR MEDS BY ELIG CLIN: HCPCS | Performed by: NURSE PRACTITIONER

## 2021-05-05 PROCEDURE — 3017F COLORECTAL CA SCREEN DOC REV: CPT | Performed by: NURSE PRACTITIONER

## 2021-05-05 PROCEDURE — 99203 OFFICE O/P NEW LOW 30 MIN: CPT | Performed by: NURSE PRACTITIONER

## 2021-05-05 RX ORDER — FLUTICASONE PROPIONATE 50 MCG
2 SPRAY, SUSPENSION (ML) NASAL DAILY
Qty: 1 BOTTLE | Refills: 5 | Status: SHIPPED | OUTPATIENT
Start: 2021-05-05 | End: 2022-05-25

## 2021-05-05 RX ORDER — METOPROLOL SUCCINATE 25 MG/1
25 TABLET, EXTENDED RELEASE ORAL DAILY
Qty: 90 TABLET | Refills: 1 | Status: SHIPPED | OUTPATIENT
Start: 2021-05-05 | End: 2021-11-05

## 2021-05-05 RX ORDER — TRAZODONE HYDROCHLORIDE 50 MG/1
50 TABLET ORAL NIGHTLY
Qty: 1 TABLET | Refills: 0 | Status: SHIPPED | OUTPATIENT
Start: 2021-05-05 | End: 2022-05-25

## 2021-05-05 RX ORDER — SERTRALINE HYDROCHLORIDE 100 MG/1
TABLET, FILM COATED ORAL
Qty: 60 TABLET | Refills: 0 | Status: SHIPPED | OUTPATIENT
Start: 2021-05-05 | End: 2021-06-07 | Stop reason: SDUPTHER

## 2021-05-05 RX ORDER — ALBUTEROL SULFATE 90 UG/1
AEROSOL, METERED RESPIRATORY (INHALATION)
Qty: 18 G | Refills: 2 | Status: SHIPPED | OUTPATIENT
Start: 2021-05-05 | End: 2022-05-25 | Stop reason: SDUPTHER

## 2021-05-05 RX ORDER — FLUTICASONE PROPIONATE 110 UG/1
AEROSOL, METERED RESPIRATORY (INHALATION)
Qty: 12 G | Refills: 5 | Status: SHIPPED | OUTPATIENT
Start: 2021-05-05 | End: 2022-05-25 | Stop reason: SDUPTHER

## 2021-05-05 RX ORDER — LORATADINE 10 MG/1
TABLET ORAL
Qty: 30 TABLET | Refills: 2 | Status: SHIPPED | OUTPATIENT
Start: 2021-05-05 | End: 2022-05-25

## 2021-05-05 RX ORDER — HYDROCHLOROTHIAZIDE 12.5 MG/1
12.5 TABLET ORAL DAILY
Qty: 30 TABLET | Refills: 2 | Status: SHIPPED | OUTPATIENT
Start: 2021-05-05 | End: 2021-08-19

## 2021-05-05 SDOH — ECONOMIC STABILITY: FOOD INSECURITY: WITHIN THE PAST 12 MONTHS, YOU WORRIED THAT YOUR FOOD WOULD RUN OUT BEFORE YOU GOT MONEY TO BUY MORE.: OFTEN TRUE

## 2021-05-05 SDOH — ECONOMIC STABILITY: TRANSPORTATION INSECURITY
IN THE PAST 12 MONTHS, HAS LACK OF TRANSPORTATION KEPT YOU FROM MEETINGS, WORK, OR FROM GETTING THINGS NEEDED FOR DAILY LIVING?: NO

## 2021-05-05 SDOH — ECONOMIC STABILITY: INCOME INSECURITY: HOW HARD IS IT FOR YOU TO PAY FOR THE VERY BASICS LIKE FOOD, HOUSING, MEDICAL CARE, AND HEATING?: SOMEWHAT HARD

## 2021-05-05 SDOH — ECONOMIC STABILITY: FOOD INSECURITY: WITHIN THE PAST 12 MONTHS, THE FOOD YOU BOUGHT JUST DIDN'T LAST AND YOU DIDN'T HAVE MONEY TO GET MORE.: OFTEN TRUE

## 2021-05-05 ASSESSMENT — ENCOUNTER SYMPTOMS
CHEST TIGHTNESS: 0
RHINORRHEA: 0
CONSTIPATION: 0
SHORTNESS OF BREATH: 0
BLOOD IN STOOL: 0
DIARRHEA: 0
EYE DISCHARGE: 0
COUGH: 0
ABDOMINAL PAIN: 0
SINUS PRESSURE: 0

## 2021-05-05 NOTE — PROGRESS NOTES
Visit Information    Have you changed or started any medications since your last visit including any over-the-counter medicines, vitamins, or herbal medicines? no   Are you having any side effects from any of your medications? -  no  Have you stopped taking any of your medications? Is so, why? -  no    Have you seen any other physician or provider since your last visit? No  Have you had any other diagnostic tests since your last visit? No  Have you been seen in the emergency room and/or had an admission to a hospital since we last saw you? No  Have you had your routine dental cleaning in the past 6 months? no    Have you activated your [employer] account? If not, what are your barriers?  No       Patient Care Team:  ADRIÁN Alcazar - CNP as PCP - General (Nurse Practitioner)    Medical History Review  Past Medical, Family, and Social History reviewed and does not contribute to the patient presenting condition    Health Maintenance   Topic Date Due    Pneumococcal 0-64 years Vaccine (1 of 1 - PPSV23) Never done    COVID-19 Vaccine (1) Never done    DTaP/Tdap/Td vaccine (1 - Tdap) Never done    Shingles Vaccine (1 of 2) Never done    Cervical cancer screen  02/01/2017    Breast cancer screen  04/14/2018    Colon cancer screen colonoscopy  09/14/2020    Flu vaccine (Season Ended) 09/01/2021    A1C test (Diabetic or Prediabetic)  10/09/2021    Lipid screen  10/09/2025    Hepatitis C screen  Completed    HIV screen  Completed    Hepatitis A vaccine  Aged Out    Hepatitis B vaccine  Aged Out    Hib vaccine  Aged Out    Meningococcal (ACWY) vaccine  Aged Out

## 2021-05-05 NOTE — PROGRESS NOTES
[facility]  [address], [address]  [phone]    Michael Lake is a 60 y.o. female who presents today for her  medical conditions/complaintsas noted below. Michael Lake is c/o of Establish Care (ntp) and Swelling (ankles swelling left side )  . HPI:     HPI  Pt is here to establish. Used to see dr Kristen Martinez, hasn't been seen in a few years. Has gerd- used to take zantac- states she has a lot of bloating, some belching. bilateal ankle swelling. Was seen by ortho. Xray wnl. After discussion pt reports started swelling after starting diclofenac- not getting any exercise    htn- states taking metoprolol- states used to be on a water pill, I don't see one prescribed in [facility]. Ortho note also discusses non compliance with diuretic. Diet- tries to eat low sodium- drinks water  Exercise- no, states she needs to  jsut went to the dentist.     Wt Readings from Last 3 Encounters:   05/05/21 188 lb 9.6 oz (85.5 kg)   04/01/21 190 lb (86.2 kg)   02/12/20 191 lb (86.6 kg)       Nursing note reviewedand discussed with patient. Patient'smedications, allergies, past medical, surgical, social and family histories werereviewed and updated as appropriate. Current Outpatient Medications on File Prior to Visit   Medication Sig Dispense Refill    vitamin D (ERGOCALCIFEROL) 1.25 MG (64133 UT) CAPS capsule Take 1 capsule by mouth once a week 8 capsule 0    albuterol (PROVENTIL) (2.5 MG/3ML) 0.083% nebulizer solution USE 1 VIAL IN AEROSOL EVERY 6 HOURS AS NEEDED 360 mL 3     No current facility-administered medications on file prior to visit.       Past Medical History:   Diagnosis Date    Alcohol use disorder, mild, abuse     Allergic rhinitis 4/23/2015    Anxiety 3/22/2016    Asthma     Depression 3/22/2016    Fibroid, uterus     GERD (gastroesophageal reflux disease)     Osteoarthritis of shoulder region 10/28/2013    Restless dysphoric mood and self-injury. Objective:     /89   Pulse 83   Temp 98 °F (36.7 °C) (Temporal)   Wt 188 lb 9.6 oz (85.5 kg)   SpO2 98%   BMI 31.38 kg/m²    Physical Exam  Constitutional:       General: She is not in acute distress. Appearance: She is well-developed. She is not diaphoretic. HENT:      Head: Normocephalic and atraumatic. Right Ear: External ear normal.      Left Ear: External ear normal.      Nose: Nose normal.   Eyes:      Conjunctiva/sclera: Conjunctivae normal.      Pupils: Pupils are equal, round, and reactive to light. Neck:      Musculoskeletal: Full passive range of motion without pain and normal range of motion. Thyroid: No thyroid mass. Trachea: Trachea normal.   Cardiovascular:      Rate and Rhythm: Normal rate and regular rhythm. Pulses:           Dorsalis pedis pulses are 3+ on the right side and 3+ on the left side. Posterior tibial pulses are 3+ on the right side and 3+ on the left side. Heart sounds: Normal heart sounds, S1 normal and S2 normal. Heart sounds not distant. No friction rub. Pulmonary:      Effort: Pulmonary effort is normal. No accessory muscle usage or respiratory distress. Breath sounds: Normal breath sounds. Abdominal:      General: Bowel sounds are normal. There is no distension. Palpations: Abdomen is soft. There is no mass. Musculoskeletal: Normal range of motion. Right lower le+ Edema present. Left lower le+ Edema present. Comments: Pain free ROM     Feet:      Right foot:      Skin integrity: No ulcer, blister, skin breakdown, erythema, warmth, callus or dry skin. Left foot:      Skin integrity: No ulcer, blister, skin breakdown, erythema, warmth or callus. Lymphadenopathy:      Cervical: No cervical adenopathy. Skin:     General: Skin is warm and dry. Findings: No rash. Neurological:      Mental Status: She is oriented to person, place, and time. Comments: Gait is normal.   Psychiatric:         Behavior: Behavior normal.         Thought Content: Thought content normal.         Judgment: Judgment normal.       Lab Results   Component Value Date     10/09/2020    K 3.9 10/09/2020     10/09/2020    CO2 22 10/09/2020    BUN 12 10/09/2020    CREATININE 0.75 10/09/2020    GLUCOSE 117 (H) 10/09/2020    CALCIUM 9.6 10/09/2020    PROT 7.5 10/09/2020    LABALBU 4.7 10/09/2020    BILITOT 0.28 (L) 10/09/2020    ALKPHOS 90 10/09/2020    AST 21 10/09/2020    ALT 28 10/09/2020    LABGLOM >60 10/09/2020    GFRAA >60 10/09/2020    GLOB NOT REPORTED 08/20/2019       Lab Results   Component Value Date    WBC 5.3 12/16/2019    HGB 12.7 12/16/2019    HCT 40.0 12/16/2019    MCV 92.6 12/16/2019     12/16/2019          Assessment/Plan         1. Essential hypertension    - metoprolol succinate (TOPROL XL) 25 MG extended release tablet; Take 1 tablet by mouth daily  Dispense: 90 tablet; Refill: 1  - hydroCHLOROthiazide (HYDRODIURIL) 12.5 MG tablet; Take 1 tablet by mouth daily  Dispense: 30 tablet; Refill: 2    2. Pre-diabetes  Diet and exercise discussed at length  - POCT glycosylated hemoglobin (Hb A1C)    3. Encounter for screening mammogram for breast cancer    - JASMINE DIGITAL SCREEN W OR WO CAD BILATERAL; Future    4. Screening for malignant neoplasm of colon    - Cologuard (For External Results Only); Future    5. Medication refill    - diclofenac (VOLTAREN) 50 MG EC tablet; Take 1 tablet by mouth 3 times daily (with meals)  Dispense: 60 tablet; Refill: 3  - loratadine (CLARITIN) 10 MG tablet; TAKE 1 TABLET ONCE DAILY  Dispense: 30 tablet; Refill: 2  - sertraline (ZOLOFT) 100 MG tablet; Take 2 tabs daily---Increase  Dispense: 60 tablet; Refill: 0  - traZODone (DESYREL) 50 MG tablet; Take 1 tablet by mouth nightly --Has supply  Dispense: 1 tablet;  Refill: 0  - fluticasone (FLOVENT HFA) 110 MCG/ACT inhaler; INHALE 2 PUFFS TWICE DAILY  Dispense: 12 g; Refill: 5 MG extended release tablet 90 tablet 1     Sig: Take 1 tablet by mouth daily    hydroCHLOROthiazide (HYDRODIURIL) 12.5 MG tablet 30 tablet 2     Sig: Take 1 tablet by mouth daily         This note was transcribed using dictation with Dragon services. Efforts were made to correct any errors but some words may be misinterpreted.     Electronically signed by Hemant Morris CNP on 5/5/2021 at 3:59 PM

## 2021-05-17 ENCOUNTER — TELEPHONE (OUTPATIENT)
Dept: PRIMARY CARE CLINIC | Age: 56
End: 2021-05-17

## 2021-05-17 NOTE — TELEPHONE ENCOUNTER
Patient states that she can't do her colon guard  ,due to rectal bleeding from Hemorid on going for a week. She says Every time she uses the restroom Hemorid opens back up . Would like to be referred to a rectal dr if possible . Health Maintenance   Topic Date Due    Pneumococcal 0-64 years Vaccine (1 of 2 - PPSV23) Never done    DTaP/Tdap/Td vaccine (1 - Tdap) Never done    Shingles Vaccine (1 of 2) Never done    Cervical cancer screen  02/01/2017    Breast cancer screen  04/14/2018    Colon cancer screen colonoscopy  09/14/2020    Flu vaccine (Season Ended) 09/01/2021    Potassium monitoring  10/09/2021    Creatinine monitoring  10/09/2021    A1C test (Diabetic or Prediabetic)  05/05/2022    Lipid screen  10/09/2025    COVID-19 Vaccine  Completed    Hepatitis C screen  Completed    HIV screen  Completed    Hepatitis A vaccine  Aged Out    Hepatitis B vaccine  Aged Out    Hib vaccine  Aged Out    Meningococcal (ACWY) vaccine  Aged Out             (applicable per patient's age: Cancer Screenings, Depression Screening, Fall Risk Screening, Immunizations)    Hemoglobin A1C (%)   Date Value   05/05/2021 6.1   10/09/2020 6.2 (H)   01/29/2020 5.8     Microalb/Crt.  Ratio (mcg/mg creat)   Date Value   10/09/2020 CANNOT BE CALCULATED     LDL Cholesterol (mg/dL)   Date Value   10/09/2020 127     AST (U/L)   Date Value   10/09/2020 21     ALT (U/L)   Date Value   10/09/2020 28     BUN (mg/dL)   Date Value   10/09/2020 12      (goal A1C is < 7)   (goal LDL is <100) need 30-50% reduction from baseline     BP Readings from Last 3 Encounters:   05/05/21 127/89   02/12/20 120/87   12/16/19 110/76    (goal /80)      All Future Testing planned in [facility]:  Lab Frequency Next Occurrence   Cologuard (For External Results Only) Once 08/13/2021   JASMINE DIGITAL SCREEN W OR WO CAD BILATERAL Once 06/05/2021       Next Visit Date:  Future Appointments   Date Time Provider Constance Jorgensen   5/20/2021 10:00 AM STA SCR MAMMO RM 2 [facility] MAMMO [facility]   8/11/2021  2:30 PM Maricruz Hull, APRN - CNP [facility]            Patient Active Problem List:     Shoulder pain, right     Neck pain on left side     Need for prophylactic vaccination and inoculation against influenza     Eczema     Allergic rhinitis     Epigastric pain     Gastroesophageal reflux disease without esophagitis     Mild persistent asthma without complication     Knee pain, bilateral     Restless leg     Anxiety     Depression     Major depressive disorder, recurrent, severe without psychotic features (HCC)     Chronic post-traumatic stress disorder (PTSD)     Alcohol use disorder, mild, abuse     Cannabis use disorder, mild, abuse     Cocaine use disorder, mild, in sustained remission (HCC)     Chronic pain of left knee     Smoker     Acute pain of left knee     Osteoarthritis of shoulder region     Multiple joint pain     Full thickness rotator cuff tear     Disorder of bursae of shoulder region     Adhesive capsulitis of shoulder

## 2021-05-17 NOTE — TELEPHONE ENCOUNTER
Patient states that she cant use her colon guard due to rectal bleeding and would like to be referred to a rectal doctor. Patient also states that everytime she has a bm it busts back open . please advise   Health Maintenance   Topic Date Due    Pneumococcal 0-64 years Vaccine (1 of 2 - PPSV23) Never done    DTaP/Tdap/Td vaccine (1 - Tdap) Never done    Shingles Vaccine (1 of 2) Never done    Cervical cancer screen  02/01/2017    Breast cancer screen  04/14/2018    Colon cancer screen colonoscopy  09/14/2020    Flu vaccine (Season Ended) 09/01/2021    Potassium monitoring  10/09/2021    Creatinine monitoring  10/09/2021    A1C test (Diabetic or Prediabetic)  05/05/2022    Lipid screen  10/09/2025    COVID-19 Vaccine  Completed    Hepatitis C screen  Completed    HIV screen  Completed    Hepatitis A vaccine  Aged Out    Hepatitis B vaccine  Aged Out    Hib vaccine  Aged Out    Meningococcal (ACWY) vaccine  Aged Out             (applicable per patient's age: Cancer Screenings, Depression Screening, Fall Risk Screening, Immunizations)    Hemoglobin A1C (%)   Date Value   05/05/2021 6.1   10/09/2020 6.2 (H)   01/29/2020 5.8     Microalb/Crt.  Ratio (mcg/mg creat)   Date Value   10/09/2020 CANNOT BE CALCULATED     LDL Cholesterol (mg/dL)   Date Value   10/09/2020 127     AST (U/L)   Date Value   10/09/2020 21     ALT (U/L)   Date Value   10/09/2020 28     BUN (mg/dL)   Date Value   10/09/2020 12      (goal A1C is < 7)   (goal LDL is <100) need 30-50% reduction from baseline     BP Readings from Last 3 Encounters:   05/05/21 127/89   02/12/20 120/87   12/16/19 110/76    (goal /80)      All Future Testing planned in [facility]:  Lab Frequency Next Occurrence   Cologuard (For External Results Only) Once 08/13/2021   JASMINE DIGITAL SCREEN W OR WO CAD BILATERAL Once 06/05/2021       Next Visit Date:  Future Appointments   Date Time Provider Constance Jorgensen   5/20/2021 10:00 AM STA SCR MAMMO RM 2 ST[facility] MAMMO [facility] Radiolog   8/11/2021  2:30 PM Shane Ocampo, APRN - CNP [facility]            Patient Active Problem List:     Shoulder pain, right     Neck pain on left side     Need for prophylactic vaccination and inoculation against influenza     Eczema     Allergic rhinitis     Epigastric pain     Gastroesophageal reflux disease without esophagitis     Mild persistent asthma without complication     Knee pain, bilateral     Restless leg     Anxiety     Depression     Major depressive disorder, recurrent, severe without psychotic features (HCC)     Chronic post-traumatic stress disorder (PTSD)     Alcohol use disorder, mild, abuse     Cannabis use disorder, mild, abuse     Cocaine use disorder, mild, in sustained remission (HCC)     Chronic pain of left knee     Smoker     Acute pain of left knee     Osteoarthritis of shoulder region     Multiple joint pain     Full thickness rotator cuff tear     Disorder of bursae of shoulder region     Adhesive capsulitis of shoulder

## 2021-06-07 DIAGNOSIS — Z76.0 MEDICATION REFILL: ICD-10-CM

## 2021-06-07 RX ORDER — SERTRALINE HYDROCHLORIDE 100 MG/1
TABLET, FILM COATED ORAL
Qty: 180 TABLET | Refills: 1 | Status: SHIPPED | OUTPATIENT
Start: 2021-06-07 | End: 2022-05-25

## 2021-06-23 ENCOUNTER — OFFICE VISIT (OUTPATIENT)
Dept: PRIMARY CARE CLINIC | Age: 56
End: 2021-06-23
Payer: COMMERCIAL

## 2021-06-23 VITALS
HEART RATE: 83 BPM | SYSTOLIC BLOOD PRESSURE: 133 MMHG | OXYGEN SATURATION: 100 % | WEIGHT: 184 LBS | BODY MASS INDEX: 30.62 KG/M2 | TEMPERATURE: 97 F | DIASTOLIC BLOOD PRESSURE: 90 MMHG

## 2021-06-23 DIAGNOSIS — J01.90 ACUTE BACTERIAL SINUSITIS: Primary | ICD-10-CM

## 2021-06-23 DIAGNOSIS — B96.89 ACUTE BACTERIAL SINUSITIS: Primary | ICD-10-CM

## 2021-06-23 PROCEDURE — 3017F COLORECTAL CA SCREEN DOC REV: CPT | Performed by: NURSE PRACTITIONER

## 2021-06-23 PROCEDURE — G8417 CALC BMI ABV UP PARAM F/U: HCPCS | Performed by: NURSE PRACTITIONER

## 2021-06-23 PROCEDURE — G8427 DOCREV CUR MEDS BY ELIG CLIN: HCPCS | Performed by: NURSE PRACTITIONER

## 2021-06-23 PROCEDURE — 1036F TOBACCO NON-USER: CPT | Performed by: NURSE PRACTITIONER

## 2021-06-23 PROCEDURE — 99213 OFFICE O/P EST LOW 20 MIN: CPT | Performed by: NURSE PRACTITIONER

## 2021-06-23 RX ORDER — METHYLPREDNISOLONE 4 MG/1
TABLET ORAL
Qty: 1 KIT | Refills: 0 | Status: SHIPPED | OUTPATIENT
Start: 2021-06-23 | End: 2021-06-29

## 2021-06-23 RX ORDER — DOXYCYCLINE HYCLATE 100 MG/1
100 CAPSULE ORAL 2 TIMES DAILY
Qty: 20 CAPSULE | Refills: 0 | Status: SHIPPED | OUTPATIENT
Start: 2021-06-23 | End: 2021-07-03

## 2021-06-23 ASSESSMENT — ENCOUNTER SYMPTOMS
SINUS PRESSURE: 1
SHORTNESS OF BREATH: 1
SORE THROAT: 1

## 2021-06-23 NOTE — PROGRESS NOTES
Visit Information    Have you changed or started any medications since your last visit including any over-the-counter medicines, vitamins, or herbal medicines? no   Are you having any side effects from any of your medications? -  no  Have you stopped taking any of your medications? Is so, why? -  no    Have you seen any other physician or provider since your last visit? No  Have you had any other diagnostic tests since your last visit? No  Have you been seen in the emergency room and/or had an admission to a hospital since we last saw you? No  Have you had your routine dental cleaning in the past 6 months? no    Have you activated your [employer] account? If not, what are your barriers?  Yes     Patient Care Team:  ADRIÁN Hogan CNP as PCP - General (Family Nurse Practitioner)  ADRIÁN Hogan CNP as PCP - [facility]    Medical History Review  Past Medical, Family, and Social History reviewed and does contribute to the patient presenting condition    Health Maintenance   Topic Date Due    Pneumococcal 0-64 years Vaccine (1 of 2 - PPSV23) Never done    DTaP/Tdap/Td vaccine (1 - Tdap) Never done    Shingles Vaccine (1 of 2) Never done    Cervical cancer screen  02/01/2017    Breast cancer screen  04/14/2018    Colon cancer screen colonoscopy  09/14/2020    Flu vaccine (Season Ended) 09/01/2021    Potassium monitoring  10/09/2021    Creatinine monitoring  10/09/2021    A1C test (Diabetic or Prediabetic)  05/05/2022    Lipid screen  10/09/2025    COVID-19 Vaccine  Completed    Hepatitis C screen  Completed    HIV screen  Completed    Hepatitis A vaccine  Aged Out    Hepatitis B vaccine  Aged Out    Hib vaccine  Aged Out    Meningococcal (ACWY) vaccine  Aged Out

## 2021-06-23 NOTE — PROGRESS NOTES
[facility]  [address], [address] [facility]  [phone]    Oumou Hatch is a 60 y.o. female who presents today for her  medical conditions/complaintsas noted below. Oumou Hatch is c/o of Sinus Problem (for about 1 week ) and Wheezing  . HPI:     Sinusitis  This is a new problem. The current episode started 1 to 4 weeks ago. The problem has been gradually worsening since onset. There has been no fever. The pain is moderate. Associated symptoms include shortness of breath, sinus pressure, sneezing and a sore throat. Past treatments include acetaminophen and oral decongestants. The treatment provided no relief. z pack works for her she reports. Has been vaccinated    Nursing note reviewedand discussed with patient. Patient'smedications, allergies, past medical, surgical, social and family histories werereviewed and updated as appropriate.   Current Outpatient Medications on File Prior to Visit   Medication Sig Dispense Refill    sertraline (ZOLOFT) 100 MG tablet Take 2 tabs daily---Increase 180 tablet 1    diclofenac (VOLTAREN) 50 MG EC tablet Take 1 tablet by mouth 3 times daily (with meals) 60 tablet 3    loratadine (CLARITIN) 10 MG tablet TAKE 1 TABLET ONCE DAILY 30 tablet 2    traZODone (DESYREL) 50 MG tablet Take 1 tablet by mouth nightly --Has supply 1 tablet 0    fluticasone (FLOVENT HFA) 110 MCG/ACT inhaler INHALE 2 PUFFS TWICE DAILY 12 g 5    albuterol sulfate HFA (VENTOLIN HFA) 108 (90 Base) MCG/ACT inhaler INHALE 2 PUFFS EVERY 6 HOURS AS NEEDED FOR WHEEZING 18 g 2    fluticasone (FLONASE) 50 MCG/ACT nasal spray 2 sprays by Nasal route daily 1 Bottle 5    metoprolol succinate (TOPROL XL) 25 MG extended release tablet Take 1 tablet by mouth daily 90 tablet 1    hydroCHLOROthiazide (HYDRODIURIL) 12.5 MG tablet Take 1 tablet by mouth daily 30 tablet 2    vitamin D (ERGOCALCIFEROL) 1.25 MG (01862 UT) CAPS capsule Take 1 capsule by mouth once a week 8 capsule 0    albuterol (PROVENTIL) (2.5 MG/3ML) 0.083% nebulizer solution USE 1 VIAL IN AEROSOL EVERY 6 HOURS AS NEEDED 360 mL 3     No current facility-administered medications on file prior to visit. Past Medical History:   Diagnosis Date    Alcohol use disorder, mild, abuse     Allergic rhinitis 4/23/2015    Anxiety 3/22/2016    Asthma     Depression 3/22/2016    Fibroid, uterus     GERD (gastroesophageal reflux disease)     Osteoarthritis of shoulder region 10/28/2013    Restless leg 3/22/2016    Ulcer       Past Surgical History:   Procedure Laterality Date    HYSTERECTOMY  2002    fibroids    KNEE SURGERY      rt knee baker cyst    ROTATOR CUFF REPAIR Right     TONSILLECTOMY AND ADENOIDECTOMY      TUBAL LIGATION  1989     Family History   Problem Relation Age of Onset    Diabetes Mother     Hypertension Mother     Diabetes Father     Hypertension Father     Cancer Sister         breat ca    Heart Disease Brother     Cancer Maternal Grandfather         lung    Cancer Paternal Grandmother         uterine     Social History     Tobacco Use    Smoking status: Former Smoker     Packs/day: 0.25     Quit date: 2/5/2018     Years since quitting: 3.4    Smokeless tobacco: Never Used    Tobacco comment: Pt is trying to cut back cigarettes on her  own. Substance Use Topics    Alcohol use: Yes     Alcohol/week: 0.0 standard drinks     Comment: patient has been drinking 1 or 2 glasses of nick per day over the wk ends in the past. Last use---2 beers 4 days ago. .      Allergies   Allergen Reactions    Amoxicillin Hives and Itching    Pcn [Penicillins] Hives       Subjective:     Review of Systems   HENT: Positive for sinus pressure, sneezing and sore throat. Respiratory: Positive for shortness of breath.       Objective:     BP (!) 133/90 (Site: Left Upper Arm, Position: Sitting, Cuff Size: Medium Adult)   Pulse 83   Temp 97 °F (36.1 °C)   Wt 184 lb (83.5 kg)   SpO2 100%   BMI 30.62 kg/m²    Physical Exam  Constitutional:       Appearance: She is well-developed. HENT:      Right Ear: Tympanic membrane and external ear normal.      Left Ear: Tympanic membrane and external ear normal.      Nose: Nose normal.      Mouth/Throat:      Pharynx: Posterior oropharyngeal erythema present. Cardiovascular:      Rate and Rhythm: Normal rate and regular rhythm. Heart sounds: Normal heart sounds, S1 normal and S2 normal.   Pulmonary:      Effort: Pulmonary effort is normal. No respiratory distress. Breath sounds: Normal breath sounds. Musculoskeletal:         General: No deformity. Normal range of motion. Cervical back: Full passive range of motion without pain and normal range of motion. Skin:     General: Skin is warm and dry. Neurological:      Mental Status: She is alert and oriented to person, place, and time. Assessment/Plan         1. Acute bacterial sinusitis    - doxycycline hyclate (VIBRAMYCIN) 100 MG capsule; Take 1 capsule by mouth 2 times daily for 10 days Take with food, but avoid dairy, calcium and MTV's 2 hours before and after the dose  Dispense: 20 capsule; Refill: 0  - methylPREDNISolone (MEDROL, MARIELENA,) 4 MG tablet; Take as directed  Dispense: 1 kit; Refill: 0    RTO if symptoms worsen or fail to improve  Pt agreeable with plan      Patient given educationalmaterials - see patient instructions. Discussed use, benefit, and side effectsof prescribed medications. All patient questions answered. Pt voiced understanding. Reviewed health maintenance. Instructed to continue current medications, diet andexercise. 1.  Liudmila received counseling on the following healthy behaviors: medication adherence  2. Patient given educational materials when available - see patient instructionswhen applicable  3. Discussed use, benefit, and side effects of prescribed medications. Barriersto medication compliance addressed.   All patient questions answered. Pt voicedunderstanding. 4.  Reviewed prior labs and health maintenance  5. Continuecurrent medications, diet and exercise. Completed Refills   Requested Prescriptions     Signed Prescriptions Disp Refills    doxycycline hyclate (VIBRAMYCIN) 100 MG capsule 20 capsule 0     Sig: Take 1 capsule by mouth 2 times daily for 10 days Take with food, but avoid dairy, calcium and MTV's 2 hours before and after the dose    methylPREDNISolone (MEDROL, MARIELENA,) 4 MG tablet 1 kit 0     Sig: Take as directed         This note was transcribed using dictation with Dragon services. Efforts were made to correct any errors but some words may be misinterpreted.     Electronically signed by ADRIÁN Chavez CNP, CNP on 7/8/2021 at 2:57 PM

## 2021-08-11 ENCOUNTER — OFFICE VISIT (OUTPATIENT)
Dept: PRIMARY CARE CLINIC | Age: 56
End: 2021-08-11
Payer: COMMERCIAL

## 2021-08-11 VITALS
DIASTOLIC BLOOD PRESSURE: 99 MMHG | SYSTOLIC BLOOD PRESSURE: 139 MMHG | TEMPERATURE: 96.8 F | WEIGHT: 185 LBS | HEART RATE: 82 BPM | BODY MASS INDEX: 30.79 KG/M2 | OXYGEN SATURATION: 100 %

## 2021-08-11 DIAGNOSIS — R22.1 LUMP IN NECK: ICD-10-CM

## 2021-08-11 DIAGNOSIS — E55.9 VITAMIN D DEFICIENCY: ICD-10-CM

## 2021-08-11 DIAGNOSIS — I10 ESSENTIAL HYPERTENSION: Primary | ICD-10-CM

## 2021-08-11 PROCEDURE — 3017F COLORECTAL CA SCREEN DOC REV: CPT | Performed by: NURSE PRACTITIONER

## 2021-08-11 PROCEDURE — 1036F TOBACCO NON-USER: CPT | Performed by: NURSE PRACTITIONER

## 2021-08-11 PROCEDURE — G8417 CALC BMI ABV UP PARAM F/U: HCPCS | Performed by: NURSE PRACTITIONER

## 2021-08-11 PROCEDURE — G8427 DOCREV CUR MEDS BY ELIG CLIN: HCPCS | Performed by: NURSE PRACTITIONER

## 2021-08-11 PROCEDURE — 99213 OFFICE O/P EST LOW 20 MIN: CPT | Performed by: NURSE PRACTITIONER

## 2021-08-11 RX ORDER — MELATONIN
1000 DAILY
Qty: 90 TABLET | Refills: 1 | Status: SHIPPED | OUTPATIENT
Start: 2021-08-11

## 2021-08-11 RX ORDER — M-VIT,TX,IRON,MINS/CALC/FOLIC 27MG-0.4MG
1 TABLET ORAL DAILY
Qty: 90 TABLET | Refills: 11 | Status: SHIPPED | OUTPATIENT
Start: 2021-08-11 | End: 2022-08-11

## 2021-08-11 RX ORDER — IBUPROFEN 800 MG/1
800 TABLET ORAL 2 TIMES DAILY PRN
Qty: 20 TABLET | Refills: 0 | Status: SHIPPED | OUTPATIENT
Start: 2021-08-11

## 2021-08-11 NOTE — PROGRESS NOTES
WHEEZING 18 g 2    fluticasone (FLONASE) 50 MCG/ACT nasal spray 2 sprays by Nasal route daily 1 Bottle 5    metoprolol succinate (TOPROL XL) 25 MG extended release tablet Take 1 tablet by mouth daily 90 tablet 1    vitamin D (ERGOCALCIFEROL) 1.25 MG (41537 UT) CAPS capsule Take 1 capsule by mouth once a week 8 capsule 0    albuterol (PROVENTIL) (2.5 MG/3ML) 0.083% nebulizer solution USE 1 VIAL IN AEROSOL EVERY 6 HOURS AS NEEDED 360 mL 3     No current facility-administered medications on file prior to visit. Past Medical History:   Diagnosis Date    Alcohol use disorder, mild, abuse     Allergic rhinitis 4/23/2015    Anxiety 3/22/2016    Asthma     Depression 3/22/2016    Fibroid, uterus     GERD (gastroesophageal reflux disease)     Osteoarthritis of shoulder region 10/28/2013    Restless leg 3/22/2016    Ulcer       Past Surgical History:   Procedure Laterality Date    HYSTERECTOMY  2002    fibroids    KNEE SURGERY      rt knee baker cyst    ROTATOR CUFF REPAIR Right     TONSILLECTOMY AND ADENOIDECTOMY      TUBAL LIGATION  1989     Family History   Problem Relation Age of Onset    Diabetes Mother     Hypertension Mother     Diabetes Father     Hypertension Father     Cancer Sister         breat ca    Heart Disease Brother     Cancer Maternal Grandfather         lung    Cancer Paternal Grandmother         uterine     Social History     Tobacco Use    Smoking status: Former Smoker     Packs/day: 0.25     Quit date: 2/5/2018     Years since quitting: 3.5    Smokeless tobacco: Never Used    Tobacco comment: Pt is trying to cut back cigarettes on her  own. Substance Use Topics    Alcohol use: Yes     Alcohol/week: 0.0 standard drinks     Comment: patient has been drinking 1 or 2 glasses of nick per day over the wk ends in the past. Last use---2 beers 4 days ago. .      Allergies   Allergen Reactions    Amoxicillin Hives and Itching    Pcn [Penicillins] Hives       Subjective: Review of Systems   Constitutional: Negative for chills and fever. Respiratory: Negative for cough and shortness of breath. Cardiovascular: Negative for chest pain, palpitations and leg swelling. Musculoskeletal: Positive for neck pain. Objective:     BP (!) 139/99   Pulse 82   Temp 96.8 °F (36 °C) (Temporal)   Wt 185 lb (83.9 kg)   SpO2 100%   BMI 30.79 kg/m²    Physical Exam  Constitutional:       Appearance: She is well-developed. HENT:      Right Ear: External ear normal.      Left Ear: External ear normal.      Nose: Nose normal.   Cardiovascular:      Rate and Rhythm: Normal rate and regular rhythm. Heart sounds: Normal heart sounds, S1 normal and S2 normal.   Pulmonary:      Effort: Pulmonary effort is normal. No respiratory distress. Breath sounds: Normal breath sounds. Musculoskeletal:         General: No deformity. Normal range of motion. Cervical back: Full passive range of motion without pain and normal range of motion. Skin:     General: Skin is warm and dry. Neurological:      Mental Status: She is alert and oriented to person, place, and time. Lab Review not applicable      Assessment/Plan         1. Vitamin D deficiency      2. Essential hypertension  Take meds regularly  Improve diet and exercise    3. Lump in neck  Muscular, tender to light palpation. Notify if no improvement  - ibuprofen (ADVIL;MOTRIN) 800 MG tablet; Take 1 tablet by mouth 2 times daily as needed for Pain  Dispense: 20 tablet; Refill: 0    RTO if symptoms worsen or fail to improve  Pt agreeable with plan      Patient given educationalmaterials - see patient instructions. Discussed use, benefit, and side effectsof prescribed medications. All patient questions answered. Pt voiced understanding. Reviewed health maintenance. Instructed to continue current medications, diet andexercise.     1Jasper Gregoryita received counseling on the following healthy behaviors: nutrition, exercise and medication adherence  2. Patient given educational materials when available - see patient instructionswhen applicable  3. Discussed use, benefit, and side effects of prescribed medications. Barriersto medication compliance addressed. All patient questions answered. Pt voicedunderstanding. 4.  Reviewed prior labs and health maintenance  5. Continuecurrent medications, diet and exercise. Completed Refills   Requested Prescriptions     Signed Prescriptions Disp Refills    ibuprofen (ADVIL;MOTRIN) 800 MG tablet 20 tablet 0     Sig: Take 1 tablet by mouth 2 times daily as needed for Pain    vitamin D3 (CHOLECALCIFEROL) 25 MCG (1000 UT) TABS tablet 90 tablet 1     Sig: Take 1 tablet by mouth daily    Multiple Vitamins-Minerals (THERAPEUTIC MULTIVITAMIN-MINERALS) tablet 90 tablet 11     Sig: Take 1 tablet by mouth daily         This note was transcribed using dictation with Dragon services. Efforts were made to correct any errors but some words may be misinterpreted.     Electronically signed by ADRIÁN Miller CNP, CNP on 8/30/2021 at 7:56 AM

## 2021-08-11 NOTE — PROGRESS NOTES
Visit Information    Have you changed or started any medications since your last visit including any over-the-counter medicines, vitamins, or herbal medicines? no   Are you having any side effects from any of your medications? -  no  Have you stopped taking any of your medications? Is so, why? -  no    Have you seen any other physician or provider since your last visit? No  Have you had any other diagnostic tests since your last visit? No  Have you been seen in the emergency room and/or had an admission to a hospital since we last saw you? No  Have you had your routine dental cleaning in the past 6 months? yes -     Have you activated your [employer] account? If not, what are your barriers?  Yes     Patient Care Team:  ADRIÁN Rider CNP as PCP - General (Family Nurse Practitioner)  ADRIÁN Rider CNP as PCP - [facility] Provider    Medical History Review  Past Medical, Family, and Social History reviewed and does not contribute to the patient presenting condition    Health Maintenance   Topic Date Due    Pneumococcal 0-64 years Vaccine (1 of 2 - PPSV23) Never done    DTaP/Tdap/Td vaccine (1 - Tdap) Never done    Shingles Vaccine (1 of 2) Never done    Cervical cancer screen  02/01/2017    Breast cancer screen  04/14/2018    Colon cancer screen colonoscopy  09/14/2020    Flu vaccine (1) 09/01/2021    Potassium monitoring  10/09/2021    Creatinine monitoring  10/09/2021    A1C test (Diabetic or Prediabetic)  05/05/2022    Lipid screen  10/09/2025    COVID-19 Vaccine  Completed    Hepatitis C screen  Completed    HIV screen  Completed    Hepatitis A vaccine  Aged Out    Hepatitis B vaccine  Aged Out    Hib vaccine  Aged Out    Meningococcal (ACWY) vaccine  Aged Out

## 2021-08-19 DIAGNOSIS — I10 ESSENTIAL HYPERTENSION: ICD-10-CM

## 2021-08-19 RX ORDER — HYDROCHLOROTHIAZIDE 12.5 MG/1
TABLET ORAL
Qty: 30 TABLET | Refills: 2 | Status: SHIPPED | OUTPATIENT
Start: 2021-08-19 | End: 2021-10-26

## 2021-08-30 ASSESSMENT — ENCOUNTER SYMPTOMS
COUGH: 0
SHORTNESS OF BREATH: 0

## 2021-10-25 DIAGNOSIS — I10 ESSENTIAL HYPERTENSION: ICD-10-CM

## 2021-10-25 NOTE — TELEPHONE ENCOUNTER
Hemoglobin A1C (%)   Date Value   05/05/2021 6.1   10/09/2020 6.2 (H)   01/29/2020 5.8             ( goal A1C is < 7)   Microalb/Crt.  Ratio (mcg/mg creat)   Date Value   10/09/2020 CANNOT BE CALCULATED     LDL Cholesterol (mg/dL)   Date Value   10/09/2020 127       (goal LDL is <100)   AST (U/L)   Date Value   10/09/2020 21     ALT (U/L)   Date Value   10/09/2020 28     BUN (mg/dL)   Date Value   10/09/2020 12     BP Readings from Last 3 Encounters:   08/11/21 (!) 139/99   06/23/21 (!) 133/90   05/05/21 127/89          (goal 120/80)        Patient Active Problem List:     Shoulder pain, right     Neck pain on left side     Need for prophylactic vaccination and inoculation against influenza     Eczema     Allergic rhinitis     Epigastric pain     Gastroesophageal reflux disease without esophagitis     Mild persistent asthma without complication     Knee pain, bilateral     Restless leg     Anxiety     Depression     Major depressive disorder, recurrent, severe without psychotic features (HCC)     Chronic post-traumatic stress disorder (PTSD)     Alcohol use disorder, mild, abuse     Cannabis use disorder, mild, abuse     Cocaine use disorder, mild, in sustained remission (HCC)     Chronic pain of left knee     Smoker     Acute pain of left knee     Osteoarthritis of shoulder region     Multiple joint pain     Full thickness rotator cuff tear     Disorder of bursae of shoulder region     Adhesive capsulitis of shoulder      ----María Soares S/W pt  Pt stated the needle sites look good, denies S&S of infection, denies fevers and denies sun burn like sensation  Pt stated she is alittle sore now  Advised pt if she has pain to take her prescribed or OTC pain medication and/or use ice/heat and that it takes 4 to 6 weeks to see the full effect  Confirmed next appt w/ pt  Pt verbalized understanding

## 2021-10-26 RX ORDER — HYDROCHLOROTHIAZIDE 12.5 MG/1
TABLET ORAL
Qty: 30 TABLET | Refills: 2 | Status: SHIPPED | OUTPATIENT
Start: 2021-10-26 | End: 2022-05-25

## 2021-11-04 DIAGNOSIS — I10 ESSENTIAL HYPERTENSION: ICD-10-CM

## 2021-11-05 RX ORDER — METOPROLOL SUCCINATE 25 MG/1
TABLET, EXTENDED RELEASE ORAL
Qty: 90 TABLET | Refills: 1 | Status: SHIPPED | OUTPATIENT
Start: 2021-11-05 | End: 2022-05-25

## 2022-04-22 DIAGNOSIS — Z76.0 MEDICATION REFILL: ICD-10-CM

## 2022-04-22 RX ORDER — FLUTICASONE PROPIONATE 110 UG/1
AEROSOL, METERED RESPIRATORY (INHALATION)
Qty: 12 EACH | Refills: 5 | OUTPATIENT
Start: 2022-04-22

## 2022-05-23 DIAGNOSIS — I10 ESSENTIAL HYPERTENSION: ICD-10-CM

## 2022-05-25 ENCOUNTER — OFFICE VISIT (OUTPATIENT)
Dept: PRIMARY CARE CLINIC | Age: 57
End: 2022-05-25
Payer: COMMERCIAL

## 2022-05-25 VITALS
DIASTOLIC BLOOD PRESSURE: 94 MMHG | SYSTOLIC BLOOD PRESSURE: 128 MMHG | OXYGEN SATURATION: 99 % | HEART RATE: 77 BPM | BODY MASS INDEX: 31.78 KG/M2 | TEMPERATURE: 97.1 F | WEIGHT: 191 LBS

## 2022-05-25 DIAGNOSIS — R20.0 NUMBNESS AND TINGLING OF BOTH FEET: ICD-10-CM

## 2022-05-25 DIAGNOSIS — Z13.29 SCREENING FOR THYROID DISORDER: ICD-10-CM

## 2022-05-25 DIAGNOSIS — Z12.31 ENCOUNTER FOR SCREENING MAMMOGRAM FOR BREAST CANCER: ICD-10-CM

## 2022-05-25 DIAGNOSIS — R73.03 PRE-DIABETES: Primary | ICD-10-CM

## 2022-05-25 DIAGNOSIS — Z76.0 MEDICATION REFILL: ICD-10-CM

## 2022-05-25 DIAGNOSIS — I10 ESSENTIAL HYPERTENSION: ICD-10-CM

## 2022-05-25 DIAGNOSIS — J45.30 MILD PERSISTENT ASTHMA WITHOUT COMPLICATION: ICD-10-CM

## 2022-05-25 DIAGNOSIS — R79.89 LOW VITAMIN D LEVEL: ICD-10-CM

## 2022-05-25 DIAGNOSIS — R20.2 NUMBNESS AND TINGLING OF BOTH FEET: ICD-10-CM

## 2022-05-25 LAB — HBA1C MFR BLD: 6 %

## 2022-05-25 PROCEDURE — 1036F TOBACCO NON-USER: CPT | Performed by: NURSE PRACTITIONER

## 2022-05-25 PROCEDURE — 83036 HEMOGLOBIN GLYCOSYLATED A1C: CPT | Performed by: NURSE PRACTITIONER

## 2022-05-25 PROCEDURE — 99214 OFFICE O/P EST MOD 30 MIN: CPT | Performed by: NURSE PRACTITIONER

## 2022-05-25 PROCEDURE — G8417 CALC BMI ABV UP PARAM F/U: HCPCS | Performed by: NURSE PRACTITIONER

## 2022-05-25 PROCEDURE — 3017F COLORECTAL CA SCREEN DOC REV: CPT | Performed by: NURSE PRACTITIONER

## 2022-05-25 PROCEDURE — G8427 DOCREV CUR MEDS BY ELIG CLIN: HCPCS | Performed by: NURSE PRACTITIONER

## 2022-05-25 RX ORDER — FLUTICASONE PROPIONATE 110 UG/1
AEROSOL, METERED RESPIRATORY (INHALATION)
Qty: 12 G | Refills: 5 | Status: SHIPPED | OUTPATIENT
Start: 2022-05-25

## 2022-05-25 RX ORDER — FLUTICASONE PROPIONATE 50 MCG
2 SPRAY, SUSPENSION (ML) NASAL DAILY
Qty: 16 G | Refills: 11 | Status: SHIPPED | OUTPATIENT
Start: 2022-05-25

## 2022-05-25 RX ORDER — ALBUTEROL SULFATE 90 UG/1
AEROSOL, METERED RESPIRATORY (INHALATION)
Qty: 18 G | Refills: 2 | Status: SHIPPED | OUTPATIENT
Start: 2022-05-25

## 2022-05-25 RX ORDER — METOPROLOL SUCCINATE 25 MG/1
TABLET, EXTENDED RELEASE ORAL
Qty: 90 TABLET | Refills: 0 | Status: SHIPPED | OUTPATIENT
Start: 2022-05-25 | End: 2022-05-25

## 2022-05-25 RX ORDER — MONTELUKAST SODIUM 10 MG/1
10 TABLET ORAL DAILY
Qty: 30 TABLET | Refills: 3 | Status: SHIPPED | OUTPATIENT
Start: 2022-05-25 | End: 2022-08-17

## 2022-05-25 RX ORDER — LOSARTAN POTASSIUM AND HYDROCHLOROTHIAZIDE 12.5; 5 MG/1; MG/1
1 TABLET ORAL DAILY
Qty: 90 TABLET | Refills: 1 | Status: SHIPPED | OUTPATIENT
Start: 2022-05-25

## 2022-05-25 ASSESSMENT — PATIENT HEALTH QUESTIONNAIRE - PHQ9
3. TROUBLE FALLING OR STAYING ASLEEP: 1
SUM OF ALL RESPONSES TO PHQ QUESTIONS 1-9: 11
4. FEELING TIRED OR HAVING LITTLE ENERGY: 3
10. IF YOU CHECKED OFF ANY PROBLEMS, HOW DIFFICULT HAVE THESE PROBLEMS MADE IT FOR YOU TO DO YOUR WORK, TAKE CARE OF THINGS AT HOME, OR GET ALONG WITH OTHER PEOPLE: 0
8. MOVING OR SPEAKING SO SLOWLY THAT OTHER PEOPLE COULD HAVE NOTICED. OR THE OPPOSITE, BEING SO FIGETY OR RESTLESS THAT YOU HAVE BEEN MOVING AROUND A LOT MORE THAN USUAL: 1
SUM OF ALL RESPONSES TO PHQ QUESTIONS 1-9: 11
9. THOUGHTS THAT YOU WOULD BE BETTER OFF DEAD, OR OF HURTING YOURSELF: 0
6. FEELING BAD ABOUT YOURSELF - OR THAT YOU ARE A FAILURE OR HAVE LET YOURSELF OR YOUR FAMILY DOWN: 1
2. FEELING DOWN, DEPRESSED OR HOPELESS: 1
1. LITTLE INTEREST OR PLEASURE IN DOING THINGS: 3
7. TROUBLE CONCENTRATING ON THINGS, SUCH AS READING THE NEWSPAPER OR WATCHING TELEVISION: 1
5. POOR APPETITE OR OVEREATING: 0
SUM OF ALL RESPONSES TO PHQ QUESTIONS 1-9: 11
SUM OF ALL RESPONSES TO PHQ QUESTIONS 1-9: 11
SUM OF ALL RESPONSES TO PHQ9 QUESTIONS 1 & 2: 4

## 2022-05-25 ASSESSMENT — ENCOUNTER SYMPTOMS
SHORTNESS OF BREATH: 0
COUGH: 0

## 2022-05-25 NOTE — PROGRESS NOTES
Tomy Pierce (:  1965) is a 60 y.o. female,Established patient, here for evaluation of the following chief complaint(s):  Follow-up (asthma and allergies, pt stated that she has been having some numbness and tingling in her feet ) and Discuss Medications (metoprolol)         ASSESSMENT/PLAN:  1. Pre-diabetes  -     POCT glycosylated hemoglobin (Hb A1C)  -     metFORMIN (GLUCOPHAGE) 500 MG tablet; Take 1 tablet by mouth 2 times daily (with meals), Disp-60 tablet, R-2Normal  -     Urinalysis; Future  2. Encounter for screening mammogram for breast cancer  -     JASMINE DIGITAL SCREEN W OR WO CAD BILATERAL; Future  3. Medication refill  -     fluticasone (FLOVENT HFA) 110 MCG/ACT inhaler; INHALE 2 PUFFS TWICE DAILY, Disp-12 g, R-5Normal  -     albuterol sulfate HFA (VENTOLIN HFA) 108 (90 Base) MCG/ACT inhaler; INHALE 2 PUFFS EVERY 6 HOURS AS NEEDED FOR WHEEZING, Disp-18 g, R-2Normal  4. Essential hypertension  -     losartan-hydroCHLOROthiazide (HYZAAR) 50-12.5 MG per tablet; Take 1 tablet by mouth daily, Disp-90 tablet, R-1Normal  -     CBC; Future  -     Comprehensive Metabolic Panel; Future  5. Numbness and tingling of both feet  -     Vitamin B12; Future  6. Screening for thyroid disorder  -     TSH; Future  7. Low vitamin D level  -     Vitamin D 25 Hydroxy; Future  8. Mild persistent asthma without complication  -     fluticasone (FLOVENT HFA) 110 MCG/ACT inhaler; INHALE 2 PUFFS TWICE DAILY, Disp-12 g, R-5Normal  -     albuterol sulfate HFA (VENTOLIN HFA) 108 (90 Base) MCG/ACT inhaler; INHALE 2 PUFFS EVERY 6 HOURS AS NEEDED FOR WHEEZING, Disp-18 g, R-2Normal  -     montelukast (SINGULAIR) 10 MG tablet; Take 1 tablet by mouth daily, Disp-30 tablet, R-3Normal      Return in about 1 month (around 2022) for Hypertension. Subjective   SUBJECTIVE/OBJECTIVE:  HPI  htn- can't be using bp meds regularly- should have been out of hctz in December. Feet tingling. Occurring for about a year. Feels like she needs to put them in cold water. She says they feel hot. States her feet swell when it is humid. States all of her meds make her thirsty. She doesn't like the way the metoprolol makes her feel. Trying to eat healthy- states oat meal, apples and raisins, orange juice for breakfast, salad for lunch and for dinner \"I go all out\". Asthma/allergies bothering her. Feels like she has cotton in her ears. Put some \"drops\" in them. Flovent last ordered in may 2021. Uses renuka prn. Ran out of flonase, states she did feel better when on flonase. Review of Systems   Constitutional: Negative for chills and fever. Respiratory: Negative for cough and shortness of breath. Cardiovascular: Negative for chest pain, palpitations and leg swelling. Objective      Vitals:    05/25/22 1552   BP: (!) 128/94   Pulse:    Temp:    SpO2:      Wt Readings from Last 3 Encounters:   05/25/22 191 lb (86.6 kg)   08/11/21 185 lb (83.9 kg)   06/23/21 184 lb (83.5 kg)     Physical Exam  Constitutional:       Appearance: She is well-developed. HENT:      Right Ear: Tympanic membrane and external ear normal.      Left Ear: Tympanic membrane and external ear normal.      Nose: Nose normal.   Cardiovascular:      Rate and Rhythm: Normal rate and regular rhythm. Pulses:           Dorsalis pedis pulses are 2+ on the right side and 2+ on the left side. Posterior tibial pulses are 2+ on the right side and 2+ on the left side. Heart sounds: Normal heart sounds, S1 normal and S2 normal.   Pulmonary:      Effort: Pulmonary effort is normal. No respiratory distress. Breath sounds: Normal breath sounds. Musculoskeletal:         General: No deformity. Normal range of motion. Cervical back: Full passive range of motion without pain and normal range of motion. Right lower leg: No edema. Left lower leg: No edema.    Feet:      Right foot:      Skin integrity: No skin breakdown, erythema or warmth. Left foot:      Skin integrity: No skin breakdown, erythema or warmth. Skin:     General: Skin is warm and dry. Neurological:      Mental Status: She is alert and oriented to person, place, and time. An electronic signature was used to authenticate this note.     --Uche Fontanez, ADRIÁN - CNP

## 2022-08-17 DIAGNOSIS — J45.30 MILD PERSISTENT ASTHMA WITHOUT COMPLICATION: ICD-10-CM

## 2022-08-17 DIAGNOSIS — R73.03 PRE-DIABETES: ICD-10-CM

## 2022-08-17 RX ORDER — MONTELUKAST SODIUM 10 MG/1
TABLET ORAL
Qty: 30 TABLET | Refills: 2 | Status: SHIPPED | OUTPATIENT
Start: 2022-08-17

## 2022-08-17 NOTE — TELEPHONE ENCOUNTER
Placed call to patient to schedule followup appt for refills. Patient is at work and will call back to schedule appt.

## 2022-10-05 ENCOUNTER — OFFICE VISIT (OUTPATIENT)
Dept: PRIMARY CARE CLINIC | Age: 57
End: 2022-10-05
Payer: COMMERCIAL

## 2022-10-05 VITALS
WEIGHT: 189 LBS | DIASTOLIC BLOOD PRESSURE: 92 MMHG | SYSTOLIC BLOOD PRESSURE: 135 MMHG | BODY MASS INDEX: 31.45 KG/M2 | HEART RATE: 86 BPM | TEMPERATURE: 97 F | OXYGEN SATURATION: 99 %

## 2022-10-05 DIAGNOSIS — J01.90 ACUTE BACTERIAL SINUSITIS: Primary | ICD-10-CM

## 2022-10-05 DIAGNOSIS — B96.89 ACUTE BACTERIAL SINUSITIS: Primary | ICD-10-CM

## 2022-10-05 PROCEDURE — 1036F TOBACCO NON-USER: CPT | Performed by: NURSE PRACTITIONER

## 2022-10-05 PROCEDURE — G8417 CALC BMI ABV UP PARAM F/U: HCPCS | Performed by: NURSE PRACTITIONER

## 2022-10-05 PROCEDURE — 3017F COLORECTAL CA SCREEN DOC REV: CPT | Performed by: NURSE PRACTITIONER

## 2022-10-05 PROCEDURE — G8484 FLU IMMUNIZE NO ADMIN: HCPCS | Performed by: NURSE PRACTITIONER

## 2022-10-05 PROCEDURE — G8427 DOCREV CUR MEDS BY ELIG CLIN: HCPCS | Performed by: NURSE PRACTITIONER

## 2022-10-05 PROCEDURE — 99213 OFFICE O/P EST LOW 20 MIN: CPT | Performed by: NURSE PRACTITIONER

## 2022-10-05 RX ORDER — DOXYCYCLINE HYCLATE 100 MG/1
100 CAPSULE ORAL 2 TIMES DAILY
Qty: 20 CAPSULE | Refills: 0 | Status: SHIPPED | OUTPATIENT
Start: 2022-10-05 | End: 2022-10-15

## 2022-10-05 RX ORDER — LORATADINE 10 MG/1
10 TABLET ORAL DAILY
Qty: 30 TABLET | Refills: 5 | Status: SHIPPED | OUTPATIENT
Start: 2022-10-05

## 2022-10-05 RX ORDER — FLUCONAZOLE 150 MG/1
150 TABLET ORAL ONCE
Qty: 1 TABLET | Refills: 0 | Status: SHIPPED | OUTPATIENT
Start: 2022-10-05 | End: 2022-10-05

## 2022-10-05 SDOH — ECONOMIC STABILITY: FOOD INSECURITY: WITHIN THE PAST 12 MONTHS, THE FOOD YOU BOUGHT JUST DIDN'T LAST AND YOU DIDN'T HAVE MONEY TO GET MORE.: NEVER TRUE

## 2022-10-05 SDOH — ECONOMIC STABILITY: FOOD INSECURITY: WITHIN THE PAST 12 MONTHS, YOU WORRIED THAT YOUR FOOD WOULD RUN OUT BEFORE YOU GOT MONEY TO BUY MORE.: NEVER TRUE

## 2022-10-05 ASSESSMENT — ENCOUNTER SYMPTOMS
SINUS COMPLAINT: 1
COUGH: 0
SWOLLEN GLANDS: 0
SINUS PRESSURE: 1
SHORTNESS OF BREATH: 0
SORE THROAT: 0

## 2022-10-05 ASSESSMENT — SOCIAL DETERMINANTS OF HEALTH (SDOH): HOW HARD IS IT FOR YOU TO PAY FOR THE VERY BASICS LIKE FOOD, HOUSING, MEDICAL CARE, AND HEATING?: NOT HARD AT ALL

## 2022-10-05 NOTE — PROGRESS NOTES
Visit Information    Have you changed or started any medications since your last visit including any over-the-counter medicines, vitamins, or herbal medicines? yes - seamoss bladder,    Are you having any side effects from any of your medications? -  no  Have you stopped taking any of your medications? Is so, why? -  no    Have you seen any other physician or provider since your last visit? No  Have you had any other diagnostic tests since your last visit? No  Have you been seen in the emergency room and/or had an admission to a hospital since we last saw you? No  Have you had your routine dental cleaning in the past 6 months? yes    Have you activated your [employer] account?  If not, what are your barriers? no     Patient Care Team:  ADRIÁN Al CNP as PCP - General (Family Nurse Practitioner)  ADRIÁN Al CNP as PCP - [facility]    Medical History Review  Past Medical, Family, and Social History reviewed and does not contribute to the patient presenting condition    Health Maintenance   Topic Date Due    Pneumococcal 0-64 years Vaccine (1 - PCV) Never done    DTaP/Tdap/Td vaccine (1 - Tdap) Never done    Shingles vaccine (1 of 2) Never done    Breast cancer screen  04/14/2018    Cervical cancer screen  02/01/2019    Colorectal Cancer Screen  09/14/2020    COVID-19 Vaccine (4 - Booster for Pfizer series) 04/01/2022    Flu vaccine (1) 08/01/2022    A1C test (Diabetic or Prediabetic)  05/25/2023    Depression Monitoring  05/25/2023    Lipids  10/09/2025    Hepatitis C screen  Completed    HIV screen  Completed    Hepatitis A vaccine  Aged Out    Hepatitis B vaccine  Aged Out    Hib vaccine  Aged Out    Meningococcal (ACWY) vaccine  Aged Out

## 2022-10-05 NOTE — PROGRESS NOTES
Jairo Coy (:  1965) is a 59 y.o. female,Established patient, here for evaluation of the following chief complaint(s):  Sinus Problem (Wants loratadine for allergiesSince the end of sept , concerns with allergies sinus meds arent working ) and Tinnitus         ASSESSMENT/PLAN:  1. Acute bacterial sinusitis  -     loratadine (CLARITIN) 10 MG tablet; Take 1 tablet by mouth daily, Disp-30 tablet, R-5Normal  -     doxycycline hyclate (VIBRAMYCIN) 100 MG capsule; Take 1 capsule by mouth 2 times daily for 10 days Take with food, but avoid dairy, calcium and MTV's 2 hours before and after the dose, Disp-20 capsule, R-0Normal    No follow-ups on file. Subjective   SUBJECTIVE/OBJECTIVE:  Sinus Problem  This is a new problem. The current episode started 1 to 4 weeks ago. The problem has been gradually worsening since onset. There has been no fever. Associated symptoms include chills and sinus pressure. Pertinent negatives include no coughing, neck pain, shortness of breath, sore throat or swollen glands. (Watery eyes, staticy ears) Treatments tried: cory sullivan ot cold. The treatment provided mild relief. Review of Systems   Constitutional:  Positive for chills. HENT:  Positive for sinus pressure. Negative for sore throat. Respiratory:  Negative for cough and shortness of breath. Musculoskeletal:  Negative for neck pain. Objective   Vitals:    10/05/22 1100   BP: (!) 135/92   Pulse:    Temp:    SpO2:        Physical Exam  Constitutional:       Appearance: She is well-developed. HENT:      Right Ear: Tympanic membrane and external ear normal.      Left Ear: Tympanic membrane and external ear normal.      Nose:      Right Sinus: Frontal sinus tenderness present. Left Sinus: Frontal sinus tenderness present. Eyes:      General: Lids are normal.         Right eye: Discharge (clear) present. Left eye: Discharge (clear) present.   Cardiovascular:      Rate and Rhythm: Normal rate and regular rhythm. Heart sounds: Normal heart sounds, S1 normal and S2 normal.   Pulmonary:      Effort: Pulmonary effort is normal. No respiratory distress. Breath sounds: Normal breath sounds. Musculoskeletal:         General: No deformity. Normal range of motion. Cervical back: Full passive range of motion without pain and normal range of motion. Skin:     General: Skin is warm and dry. Neurological:      Mental Status: She is alert and oriented to person, place, and time. An electronic signature was used to authenticate this note.     --Tess Madrid, APRN - CNP

## 2022-11-18 DIAGNOSIS — R73.03 PRE-DIABETES: ICD-10-CM

## 2022-11-18 NOTE — TELEPHONE ENCOUNTER
Health Maintenance   Topic Date Due    Pneumococcal 0-64 years Vaccine (1 - PCV) Never done    DTaP/Tdap/Td vaccine (1 - Tdap) Never done    Shingles vaccine (1 of 2) Never done    Breast cancer screen  04/14/2018    Colorectal Cancer Screen  09/14/2020    COVID-19 Vaccine (4 - Booster for Pfizer series) 01/26/2022    Flu vaccine (1) 08/01/2022    A1C test (Diabetic or Prediabetic)  05/25/2023    Depression Monitoring  05/25/2023    Lipids  10/09/2025    Hepatitis C screen  Completed    HIV screen  Completed    Hepatitis A vaccine  Aged Out    Hib vaccine  Aged Out    Meningococcal (ACWY) vaccine  Aged Out             (applicable per patient's age: Cancer Screenings, Depression Screening, Fall Risk Screening, Immunizations)    Hemoglobin A1C (%)   Date Value   05/25/2022 6.0   05/05/2021 6.1   10/09/2020 6.2 (H)     Microalb/Crt. Ratio (mcg/mg creat)   Date Value   10/09/2020 CANNOT BE CALCULATED     LDL Cholesterol (mg/dL)   Date Value   10/09/2020 127     AST (U/L)   Date Value   10/09/2020 21     ALT (U/L)   Date Value   10/09/2020 28     BUN (mg/dL)   Date Value   10/09/2020 12      (goal A1C is < 7)   (goal LDL is <100) need 30-50% reduction from baseline     BP Readings from Last 3 Encounters:   10/05/22 (!) 135/92   05/25/22 (!) 128/94   08/11/21 (!) 139/99    (goal /80)      All Future Testing planned in [facility]:  Lab Frequency Next Occurrence   Vitamin B12 Once 06/24/2022   CBC Once 05/25/2022   Comprehensive Metabolic Panel Once 35/28/9758   TSH Once 05/25/2022   JASMINE DIGITAL SCREEN W OR WO CAD BILATERAL Once 05/25/2022   Urinalysis Once 05/25/2022   Vitamin D 25 Hydroxy Once 05/25/2022       Next Visit Date:  No future appointments.          Patient Active Problem List:     Shoulder pain, right     Neck pain on left side     Need for prophylactic vaccination and inoculation against influenza     Eczema     Allergic rhinitis     Epigastric pain     Gastroesophageal reflux disease without esophagitis Mild persistent asthma without complication     Knee pain, bilateral     Restless leg     Anxiety     Depression     Major depressive disorder, recurrent, severe without psychotic features (HCC)     Chronic post-traumatic stress disorder (PTSD)     Alcohol use disorder, mild, abuse     Cannabis use disorder, mild, abuse     Cocaine use disorder, mild, in sustained remission (HCC)     Chronic pain of left knee     Smoker     Acute pain of left knee     Osteoarthritis of shoulder region     Multiple joint pain     Full thickness rotator cuff tear     Disorder of bursae of shoulder region     Adhesive capsulitis of shoulder

## 2022-11-24 DIAGNOSIS — I10 ESSENTIAL HYPERTENSION: ICD-10-CM

## 2022-11-24 DIAGNOSIS — J45.30 MILD PERSISTENT ASTHMA WITHOUT COMPLICATION: ICD-10-CM

## 2022-11-25 RX ORDER — MONTELUKAST SODIUM 10 MG/1
TABLET ORAL
Qty: 90 TABLET | Refills: 1 | Status: SHIPPED | OUTPATIENT
Start: 2022-11-25

## 2022-11-25 NOTE — TELEPHONE ENCOUNTER
Health Maintenance   Topic Date Due    Pneumococcal 0-64 years Vaccine (1 - PCV) Never done    DTaP/Tdap/Td vaccine (1 - Tdap) Never done    Shingles vaccine (1 of 2) Never done    Breast cancer screen  04/14/2018    Colorectal Cancer Screen  09/14/2020    COVID-19 Vaccine (4 - Booster for Pfizer series) 01/26/2022    Flu vaccine (1) 08/01/2022    A1C test (Diabetic or Prediabetic)  05/25/2023    Depression Monitoring  05/25/2023    Lipids  10/09/2025    Hepatitis C screen  Completed    HIV screen  Completed    Hepatitis A vaccine  Aged Out    Hib vaccine  Aged Out    Meningococcal (ACWY) vaccine  Aged Out             (applicable per patient's age: Cancer Screenings, Depression Screening, Fall Risk Screening, Immunizations)    Hemoglobin A1C (%)   Date Value   05/25/2022 6.0   05/05/2021 6.1   10/09/2020 6.2 (H)     Microalb/Crt. Ratio (mcg/mg creat)   Date Value   10/09/2020 CANNOT BE CALCULATED     LDL Cholesterol (mg/dL)   Date Value   10/09/2020 127     AST (U/L)   Date Value   10/09/2020 21     ALT (U/L)   Date Value   10/09/2020 28     BUN (mg/dL)   Date Value   10/09/2020 12      (goal A1C is < 7)   (goal LDL is <100) need 30-50% reduction from baseline     BP Readings from Last 3 Encounters:   10/05/22 (!) 135/92   05/25/22 (!) 128/94   08/11/21 (!) 139/99    (goal /80)      All Future Testing planned in [facility]:  Lab Frequency Next Occurrence   Vitamin B12 Once 06/24/2022   CBC Once 05/25/2022   Comprehensive Metabolic Panel Once 05/25/2022   TSH Once 05/25/2022   JASMINE DIGITAL SCREEN W OR WO CAD BILATERAL Once 05/25/2022   Urinalysis Once 05/25/2022   Vitamin D 25 Hydroxy Once 05/25/2022       Next Visit Date:  No future appointments.         Patient Active Problem List:     Shoulder pain, right     Neck pain on left side     Need for prophylactic vaccination and inoculation against influenza     Eczema     Allergic rhinitis     Epigastric pain     Gastroesophageal reflux disease without esophagitis    Mild persistent asthma without complication     Knee pain, bilateral     Restless leg     Anxiety     Depression     Major depressive disorder, recurrent, severe without psychotic features (HCC)     Chronic post-traumatic stress disorder (PTSD)     Alcohol use disorder, mild, abuse     Cannabis use disorder, mild, abuse     Cocaine use disorder, mild, in sustained remission (HCC)     Chronic pain of left knee     Smoker     Acute pain of left knee     Osteoarthritis of shoulder region     Multiple joint pain     Full thickness rotator cuff tear     Disorder of bursae of shoulder region     Adhesive capsulitis of shoulder

## 2022-11-28 RX ORDER — LOSARTAN POTASSIUM AND HYDROCHLOROTHIAZIDE 12.5; 5 MG/1; MG/1
TABLET ORAL
Qty: 90 TABLET | Refills: 1 | Status: SHIPPED | OUTPATIENT
Start: 2022-11-28

## 2023-02-03 ENCOUNTER — OFFICE VISIT (OUTPATIENT)
Dept: PRIMARY CARE CLINIC | Age: 58
End: 2023-02-03
Payer: COMMERCIAL

## 2023-02-03 VITALS
BODY MASS INDEX: 31.82 KG/M2 | HEART RATE: 75 BPM | OXYGEN SATURATION: 98 % | SYSTOLIC BLOOD PRESSURE: 120 MMHG | WEIGHT: 191 LBS | HEIGHT: 65 IN | DIASTOLIC BLOOD PRESSURE: 89 MMHG

## 2023-02-03 DIAGNOSIS — B37.9 CANDIDA INFECTION: ICD-10-CM

## 2023-02-03 DIAGNOSIS — B96.89 BACTERIAL SINUSITIS: Primary | ICD-10-CM

## 2023-02-03 DIAGNOSIS — J32.9 BACTERIAL SINUSITIS: Primary | ICD-10-CM

## 2023-02-03 PROCEDURE — 99213 OFFICE O/P EST LOW 20 MIN: CPT | Performed by: NURSE PRACTITIONER

## 2023-02-03 PROCEDURE — G8417 CALC BMI ABV UP PARAM F/U: HCPCS | Performed by: NURSE PRACTITIONER

## 2023-02-03 PROCEDURE — 3017F COLORECTAL CA SCREEN DOC REV: CPT | Performed by: NURSE PRACTITIONER

## 2023-02-03 PROCEDURE — G8427 DOCREV CUR MEDS BY ELIG CLIN: HCPCS | Performed by: NURSE PRACTITIONER

## 2023-02-03 PROCEDURE — 1036F TOBACCO NON-USER: CPT | Performed by: NURSE PRACTITIONER

## 2023-02-03 PROCEDURE — G8484 FLU IMMUNIZE NO ADMIN: HCPCS | Performed by: NURSE PRACTITIONER

## 2023-02-03 RX ORDER — FLUCONAZOLE 150 MG/1
150 TABLET ORAL ONCE
Qty: 1 TABLET | Refills: 0 | Status: SHIPPED | OUTPATIENT
Start: 2023-02-03 | End: 2023-02-03

## 2023-02-03 RX ORDER — DOXYCYCLINE HYCLATE 100 MG
100 TABLET ORAL 2 TIMES DAILY
Qty: 14 TABLET | Refills: 0 | Status: SHIPPED | OUTPATIENT
Start: 2023-02-03 | End: 2023-02-10

## 2023-02-03 ASSESSMENT — PATIENT HEALTH QUESTIONNAIRE - PHQ9
10. IF YOU CHECKED OFF ANY PROBLEMS, HOW DIFFICULT HAVE THESE PROBLEMS MADE IT FOR YOU TO DO YOUR WORK, TAKE CARE OF THINGS AT HOME, OR GET ALONG WITH OTHER PEOPLE: 0
SUM OF ALL RESPONSES TO PHQ QUESTIONS 1-9: 0
4. FEELING TIRED OR HAVING LITTLE ENERGY: 0
9. THOUGHTS THAT YOU WOULD BE BETTER OFF DEAD, OR OF HURTING YOURSELF: 0
SUM OF ALL RESPONSES TO PHQ QUESTIONS 1-9: 0
SUM OF ALL RESPONSES TO PHQ QUESTIONS 1-9: 0
6. FEELING BAD ABOUT YOURSELF - OR THAT YOU ARE A FAILURE OR HAVE LET YOURSELF OR YOUR FAMILY DOWN: 0
3. TROUBLE FALLING OR STAYING ASLEEP: 0
7. TROUBLE CONCENTRATING ON THINGS, SUCH AS READING THE NEWSPAPER OR WATCHING TELEVISION: 0
2. FEELING DOWN, DEPRESSED OR HOPELESS: 0
8. MOVING OR SPEAKING SO SLOWLY THAT OTHER PEOPLE COULD HAVE NOTICED. OR THE OPPOSITE, BEING SO FIGETY OR RESTLESS THAT YOU HAVE BEEN MOVING AROUND A LOT MORE THAN USUAL: 0
5. POOR APPETITE OR OVEREATING: 0
SUM OF ALL RESPONSES TO PHQ QUESTIONS 1-9: 0

## 2023-02-03 NOTE — PROGRESS NOTES
[address] PRIMARY CARE  [address] 928 [address] [address]  Dept: [phone]       Amilcar Wilson is a 59 y.o. female who presents today for her  medical conditions/complaintsas noted below. Amilcar Wilson is c/o of Sinusitis (Pt has been having congestion cough and drainage with sinus pressure her ears feel full and have pressure X 2 weeks )      HPI:     HPI    Patient presents for same-day appointment with concerns for several weeks of sinus congestion, sensation of ear ringing and fullness and headaches. Patient states she had a viral like upper respiratory illness 3 weeks ago. She was managing symptoms with over-the-counter Mucinex, acetaminophen and herbal teas. Upper respiratory symptoms have resolved, however, patient continues to endorse sinus symptoms. She denies any known fevers but does endorse intermittent episodes of diaphoresis and chills. There is sinus pressure on palpation along with sensation of ear fullness and gesturing reported by the patient. Cervical lymphadenopathy noted along with posterior pharynx erythema and postnasal drip.     Past Medical History:   Diagnosis Date    Alcohol use disorder, mild, abuse     Allergic rhinitis 4/23/2015    Anxiety 3/22/2016    Asthma     Depression 3/22/2016    Fibroid, uterus     GERD (gastroesophageal reflux disease)     Osteoarthritis of shoulder region 10/28/2013    Restless leg 3/22/2016    Ulcer       Past Surgical History:   Procedure Laterality Date    HYSTERECTOMY (CERVIX STATUS UNKNOWN)  2002    fibroids    KNEE SURGERY      rt knee baker cyst    ROTATOR CUFF REPAIR Right     TONSILLECTOMY AND ADENOIDECTOMY      TUBAL LIGATION  1989     Family History   Problem Relation Age of Onset    Diabetes Mother     Hypertension Mother     Diabetes Father     Hypertension Father     Cancer Sister         breat ca    Heart Disease Brother     Cancer Maternal Grandfather         lung    Cancer Paternal Grandmother         uterine     Social History     Tobacco Use    Smoking status: Former     Packs/day: 0.25     Types: Cigarettes     Quit date: 2018     Years since quittin.0    Smokeless tobacco: Never    Tobacco comments:     Pt is trying to cut back cigarettes on her  own. Substance Use Topics    Alcohol use: Yes     Alcohol/week: 0.0 standard drinks     Comment: patient has been drinking 1 or 2 glasses of nick per day over the wk ends in the past. Last use---2 beers 4 days ago. .      Current Outpatient Medications   Medication Sig Dispense Refill    doxycycline hyclate (VIBRA-TABS) 100 MG tablet Take 1 tablet by mouth 2 times daily for 7 days 14 tablet 0    losartan-hydroCHLOROthiazide (HYZAAR) 50-12.5 MG per tablet TAKE 1 TABLET BY MOUTH EVERY DAY 90 tablet 1    montelukast (SINGULAIR) 10 MG tablet TAKE 1 TABLET BY MOUTH EVERY DAY 90 tablet 1    metFORMIN (GLUCOPHAGE) 500 MG tablet TAKE 1 TABLET BY MOUTH TWICE A DAY WITH MEALS 180 tablet 1    loratadine (CLARITIN) 10 MG tablet Take 1 tablet by mouth daily 30 tablet 5    fluticasone (FLOVENT HFA) 110 MCG/ACT inhaler INHALE 2 PUFFS TWICE DAILY 12 g 5    albuterol sulfate HFA (VENTOLIN HFA) 108 (90 Base) MCG/ACT inhaler INHALE 2 PUFFS EVERY 6 HOURS AS NEEDED FOR WHEEZING 18 g 2    fluticasone (FLONASE) 50 MCG/ACT nasal spray 2 sprays by Nasal route daily 16 g 11    ibuprofen (ADVIL;MOTRIN) 800 MG tablet Take 1 tablet by mouth 2 times daily as needed for Pain 20 tablet 0    vitamin D3 (CHOLECALCIFEROL) 25 MCG (1000 UT) TABS tablet Take 1 tablet by mouth daily 90 tablet 1    albuterol (PROVENTIL) (2.5 MG/3ML) 0.083% nebulizer solution USE 1 VIAL IN AEROSOL EVERY 6 HOURS AS NEEDED 360 mL 3    Multiple Vitamins-Minerals (THERAPEUTIC MULTIVITAMIN-MINERALS) tablet Take 1 tablet by mouth daily 90 tablet 11     No current facility-administered medications for this visit.      Allergies   Allergen Reactions Amoxicillin Hives and Itching    Pcn [Penicillins] Hives       Health Maintenance   Topic Date Due    Pneumococcal 0-64 years Vaccine (1 - PCV) Never done    DTaP/Tdap/Td vaccine (1 - Tdap) Never done    Shingles vaccine (1 of 2) Never done    Breast cancer screen  04/14/2018    Colorectal Cancer Screen  09/14/2020    COVID-19 Vaccine (4 - Booster for Pfizer series) 01/26/2022    Flu vaccine (1) 08/01/2022    A1C test (Diabetic or Prediabetic)  05/25/2023    Depression Monitoring  02/03/2024    Lipids  10/09/2025    Hepatitis C screen  Completed    HIV screen  Completed    Hepatitis A vaccine  Aged Out    Hib vaccine  Aged Out    Meningococcal (ACWY) vaccine  Aged Out       Review of Systems   Constitutional:  Positive for chills, diaphoresis and fatigue. Negative for activity change, appetite change and fever. HENT:  Positive for congestion, postnasal drip, rhinorrhea and tinnitus. Negative for sinus pressure, sinus pain and sore throat. Eyes:  Negative for photophobia and visual disturbance. Respiratory:  Negative for cough, chest tightness, shortness of breath and wheezing. Cardiovascular:  Negative for chest pain. Gastrointestinal:  Negative for abdominal pain, diarrhea, nausea and vomiting. Endocrine: Negative for polydipsia and polyuria. Genitourinary:  Negative for difficulty urinating. Musculoskeletal:  Negative for back pain and neck pain. Skin:  Negative for color change. Neurological:  Negative for dizziness, light-headedness and headaches. Psychiatric/Behavioral:  Negative for behavioral problems. Objective:     Physical Exam  Vitals and nursing note reviewed. Constitutional:       General: She is not in acute distress. Appearance: Normal appearance. She is obese. She is not ill-appearing. HENT:      Head: Normocephalic and atraumatic. Right Ear: Tympanic membrane and external ear normal. There is no impacted cerumen.       Left Ear: Tympanic membrane and external ear normal. There is no impacted cerumen. Nose: Nose normal. No congestion or rhinorrhea. Mouth/Throat:      Mouth: Mucous membranes are moist.      Pharynx: Oropharynx is clear. Posterior oropharyngeal erythema present. No oropharyngeal exudate. Eyes:      Extraocular Movements: Extraocular movements intact. Conjunctiva/sclera: Conjunctivae normal.      Pupils: Pupils are equal, round, and reactive to light. Cardiovascular:      Rate and Rhythm: Normal rate and regular rhythm. Pulses: Normal pulses. Heart sounds: Normal heart sounds. No murmur heard. Pulmonary:      Effort: Pulmonary effort is normal. No respiratory distress. Breath sounds: Normal breath sounds. No wheezing. Abdominal:      General: Abdomen is flat. Bowel sounds are normal.      Palpations: Abdomen is soft. Tenderness: There is no abdominal tenderness. Musculoskeletal:         General: No swelling or signs of injury. Normal range of motion. Cervical back: Normal range of motion. Lymphadenopathy:      Cervical: Cervical adenopathy present. Skin:     General: Skin is warm and dry. Capillary Refill: Capillary refill takes less than 2 seconds. Neurological:      General: No focal deficit present. Mental Status: She is alert and oriented to person, place, and time. Mental status is at baseline. Motor: No weakness. Gait: Gait normal.   Psychiatric:         Mood and Affect: Mood normal.         Behavior: Behavior normal.         Thought Content: Thought content normal.         Judgment: Judgment normal.       Assessment:      No results found for this visit on 02/03/23. Heavenly Hernandez was seen today for sinusitis. Diagnoses and all orders for this visit:    Bacterial sinusitis  -     doxycycline hyclate (VIBRA-TABS) 100 MG tablet; Take 1 tablet by mouth 2 times daily for 7 days    Candida infection  -     fluconazole (DIFLUCAN) 150 MG tablet;  Take 1 tablet by mouth once for 1 dose        No follow-ups on file. Plan of care reviewed with patient. Questions and concerns addressed to patient satisfaction. Follow up as directed.      Electronically signed by ADRIÁN Draper CNP, PACon 2/6/2023 at 9:27 AM

## 2023-02-06 ASSESSMENT — ENCOUNTER SYMPTOMS
VOMITING: 0
SINUS PAIN: 0
RHINORRHEA: 1
BACK PAIN: 0
SHORTNESS OF BREATH: 0
DIARRHEA: 0
COLOR CHANGE: 0
WHEEZING: 0
CHEST TIGHTNESS: 0
COUGH: 0
PHOTOPHOBIA: 0
NAUSEA: 0
SORE THROAT: 0
SINUS PRESSURE: 0
ABDOMINAL PAIN: 0

## 2023-04-06 ENCOUNTER — NURSE TRIAGE (OUTPATIENT)
Dept: OTHER | Facility: CLINIC | Age: 58
End: 2023-04-06

## 2023-04-06 ENCOUNTER — TELEPHONE (OUTPATIENT)
Dept: PRIMARY CARE CLINIC | Age: 58
End: 2023-04-06

## 2023-04-06 NOTE — TELEPHONE ENCOUNTER
Location of patient: [address] call from [place] at [facility] with [employer]. Subjective: Caller states \"I started feeling bad about 3 days ago. I had mucous and congestion in my chest and a sinus infection. I have been taking sinus medicine I took some delsym and it made me have diarrhea. I have a headache and now am having dizziness. I can't stand up for long The head cold is terrible but I think I need an antibiotic. It's in my head and my ears are ringing. I am supposed to go back to work tomorrow but there is no way I can. \"     Current Symptoms: sinus congestion    Onset: 3 days ago; worsening    Associated Symptoms:  dizziness, ears ringing, headache , eye drainage    Pain Severity: 6/10; ;     Temperature: denies fever but admits to sweats/chills     What has been tried: delsym. Tylenol, sinus and allergy medicine, sudafed, benadryl    LMP: NA Pregnant: NA    Recommended disposition: Go to [facility]    Care advice provided, patient verbalizes understanding; denies any other questions or concerns; instructed to call back for any new or worsening symptoms. Writer provided warm transfer to [facility] Demetrius at [facility] for assistance. Attention Provider: Thank you for allowing me to participate in the care of your patient. The patient was connected to triage in response to information provided to the ECC/PSC. Please do not respond through this encounter as the response is not directed to a shared pool.       Reason for Disposition   SEVERE sinus pain    Protocols used: Sinus Pain or Congestion-ADULT-[place]

## 2023-05-30 DIAGNOSIS — R73.03 PRE-DIABETES: ICD-10-CM

## 2023-05-30 DIAGNOSIS — I10 ESSENTIAL HYPERTENSION: ICD-10-CM

## 2023-05-30 DIAGNOSIS — J45.30 MILD PERSISTENT ASTHMA WITHOUT COMPLICATION: ICD-10-CM

## 2023-05-31 DIAGNOSIS — B96.89 ACUTE BACTERIAL SINUSITIS: ICD-10-CM

## 2023-05-31 DIAGNOSIS — J01.90 ACUTE BACTERIAL SINUSITIS: ICD-10-CM

## 2023-05-31 RX ORDER — MONTELUKAST SODIUM 10 MG/1
TABLET ORAL
Qty: 90 TABLET | Refills: 1 | Status: SHIPPED | OUTPATIENT
Start: 2023-05-31

## 2023-05-31 RX ORDER — FLUTICASONE PROPIONATE 50 MCG
SPRAY, SUSPENSION (ML) NASAL
Qty: 3 EACH | Refills: 3 | Status: SHIPPED | OUTPATIENT
Start: 2023-05-31

## 2023-05-31 RX ORDER — LOSARTAN POTASSIUM AND HYDROCHLOROTHIAZIDE 12.5; 5 MG/1; MG/1
TABLET ORAL
Qty: 90 TABLET | Refills: 1 | Status: SHIPPED | OUTPATIENT
Start: 2023-05-31

## 2023-05-31 RX ORDER — LORATADINE 10 MG/1
TABLET ORAL
Qty: 90 TABLET | Refills: 1 | Status: SHIPPED | OUTPATIENT
Start: 2023-05-31

## 2023-06-21 ENCOUNTER — OFFICE VISIT (OUTPATIENT)
Dept: PRIMARY CARE CLINIC | Age: 58
End: 2023-06-21
Payer: COMMERCIAL

## 2023-06-21 ENCOUNTER — HOSPITAL ENCOUNTER (OUTPATIENT)
Dept: GENERAL RADIOLOGY | Age: 58
Discharge: HOME | End: 2023-06-23
Payer: COMMERCIAL

## 2023-06-21 ENCOUNTER — HOSPITAL ENCOUNTER (OUTPATIENT)
Age: 58
Discharge: HOME | End: 2023-06-23
Payer: COMMERCIAL

## 2023-06-21 ENCOUNTER — HOSPITAL ENCOUNTER (OUTPATIENT)
Age: 58
Discharge: HOME | End: 2023-06-21
Payer: COMMERCIAL

## 2023-06-21 ENCOUNTER — TELEPHONE (OUTPATIENT)
Dept: GASTROENTEROLOGY | Age: 58
End: 2023-06-21

## 2023-06-21 VITALS
TEMPERATURE: 97.2 F | WEIGHT: 184 LBS | OXYGEN SATURATION: 96 % | SYSTOLIC BLOOD PRESSURE: 109 MMHG | DIASTOLIC BLOOD PRESSURE: 74 MMHG | BODY MASS INDEX: 30.62 KG/M2 | HEART RATE: 79 BPM

## 2023-06-21 DIAGNOSIS — Z13.1 SCREENING FOR DIABETES MELLITUS: ICD-10-CM

## 2023-06-21 DIAGNOSIS — M54.50 LUMBAR PAIN: Primary | ICD-10-CM

## 2023-06-21 DIAGNOSIS — Z13.220 SCREENING, LIPID: ICD-10-CM

## 2023-06-21 DIAGNOSIS — Z12.31 ENCOUNTER FOR SCREENING MAMMOGRAM FOR BREAST CANCER: ICD-10-CM

## 2023-06-21 DIAGNOSIS — Z12.11 SCREENING FOR MALIGNANT NEOPLASM OF COLON: ICD-10-CM

## 2023-06-21 DIAGNOSIS — Z79.899 MEDICATION MANAGEMENT: ICD-10-CM

## 2023-06-21 DIAGNOSIS — M54.50 LUMBAR PAIN: ICD-10-CM

## 2023-06-21 LAB
ALBUMIN SERPL-MCNC: 4.4 G/DL (ref 3.5–5.2)
ALBUMIN/GLOB SERPL: 1.7 {RATIO} (ref 1–2.5)
ALP SERPL-CCNC: 94 U/L (ref 35–104)
ALT SERPL-CCNC: 15 U/L (ref 5–33)
ANION GAP SERPL CALCULATED.3IONS-SCNC: 12 MMOL/L (ref 9–17)
AST SERPL-CCNC: 16 U/L
BILIRUB SERPL-MCNC: 0.4 MG/DL (ref 0.3–1.2)
BUN SERPL-MCNC: 14 MG/DL (ref 6–20)
CALCIUM SERPL-MCNC: 9.5 MG/DL (ref 8.6–10.4)
CHLORIDE SERPL-SCNC: 106 MMOL/L (ref 98–107)
CHOLEST SERPL-MCNC: 200 MG/DL
CHOLESTEROL/HDL RATIO: 3.9
CO2 SERPL-SCNC: 22 MMOL/L (ref 20–31)
CREAT SERPL-MCNC: 0.73 MG/DL (ref 0.5–0.9)
ERYTHROCYTE [DISTWIDTH] IN BLOOD BY AUTOMATED COUNT: 13 % (ref 11.8–14.4)
EST. AVERAGE GLUCOSE BLD GHB EST-MCNC: 128 MG/DL
GFR SERPL CREATININE-BSD FRML MDRD: >60 ML/MIN/1.73M2
GLUCOSE SERPL-MCNC: 107 MG/DL (ref 70–99)
HBA1C MFR BLD: 6.1 % (ref 4–6)
HCT VFR BLD AUTO: 39.7 % (ref 36.3–47.1)
HDLC SERPL-MCNC: 51 MG/DL
HGB BLD-MCNC: 13 G/DL (ref 11.9–15.1)
LDLC SERPL CALC-MCNC: 131 MG/DL (ref 0–130)
MCH RBC QN AUTO: 29.3 PG (ref 25.2–33.5)
MCHC RBC AUTO-ENTMCNC: 32.7 G/DL (ref 28.4–34.8)
MCV RBC AUTO: 89.4 FL (ref 82.6–102.9)
NRBC AUTOMATED: 0 PER 100 WBC
PLATELET # BLD AUTO: 287 K/UL (ref 138–453)
PMV BLD AUTO: 9.3 FL (ref 8.1–13.5)
POTASSIUM SERPL-SCNC: 3.3 MMOL/L (ref 3.7–5.3)
PROT SERPL-MCNC: 7 G/DL (ref 6.4–8.3)
RBC # BLD AUTO: 4.44 M/UL (ref 3.95–5.11)
SODIUM SERPL-SCNC: 140 MMOL/L (ref 135–144)
TRIGL SERPL-MCNC: 91 MG/DL
WBC OTHER # BLD: 5.5 K/UL (ref 3.5–11.3)

## 2023-06-21 PROCEDURE — 72100 X-RAY EXAM L-S SPINE 2/3 VWS: CPT

## 2023-06-21 PROCEDURE — 80053 COMPREHEN METABOLIC PANEL: CPT

## 2023-06-21 PROCEDURE — 85027 COMPLETE CBC AUTOMATED: CPT

## 2023-06-21 PROCEDURE — 36415 COLL VENOUS BLD VENIPUNCTURE: CPT

## 2023-06-21 PROCEDURE — 3017F COLORECTAL CA SCREEN DOC REV: CPT | Performed by: NURSE PRACTITIONER

## 2023-06-21 PROCEDURE — 83036 HEMOGLOBIN GLYCOSYLATED A1C: CPT

## 2023-06-21 PROCEDURE — 80061 LIPID PANEL: CPT

## 2023-06-21 PROCEDURE — 99214 OFFICE O/P EST MOD 30 MIN: CPT | Performed by: NURSE PRACTITIONER

## 2023-06-21 PROCEDURE — 1036F TOBACCO NON-USER: CPT | Performed by: NURSE PRACTITIONER

## 2023-06-21 PROCEDURE — G8427 DOCREV CUR MEDS BY ELIG CLIN: HCPCS | Performed by: NURSE PRACTITIONER

## 2023-06-21 PROCEDURE — G8417 CALC BMI ABV UP PARAM F/U: HCPCS | Performed by: NURSE PRACTITIONER

## 2023-06-21 SDOH — ECONOMIC STABILITY: FOOD INSECURITY: WITHIN THE PAST 12 MONTHS, THE FOOD YOU BOUGHT JUST DIDN'T LAST AND YOU DIDN'T HAVE MONEY TO GET MORE.: NEVER TRUE

## 2023-06-21 SDOH — ECONOMIC STABILITY: INCOME INSECURITY: HOW HARD IS IT FOR YOU TO PAY FOR THE VERY BASICS LIKE FOOD, HOUSING, MEDICAL CARE, AND HEATING?: NOT HARD AT ALL

## 2023-06-21 SDOH — ECONOMIC STABILITY: FOOD INSECURITY: WITHIN THE PAST 12 MONTHS, YOU WORRIED THAT YOUR FOOD WOULD RUN OUT BEFORE YOU GOT MONEY TO BUY MORE.: NEVER TRUE

## 2023-06-21 SDOH — ECONOMIC STABILITY: HOUSING INSECURITY
IN THE LAST 12 MONTHS, WAS THERE A TIME WHEN YOU DID NOT HAVE A STEADY PLACE TO SLEEP OR SLEPT IN A SHELTER (INCLUDING NOW)?: NO

## 2023-06-21 ASSESSMENT — ENCOUNTER SYMPTOMS
SHORTNESS OF BREATH: 0
COUGH: 0

## 2023-06-21 NOTE — PROGRESS NOTES
Leigh Salguero (:  1965) is a 59 y.o. female,Established patient, here for evaluation of the following chief complaint(s):  Back Pain (Was seen at [address] [place] [address] for back pain. Patient stated it has been issue for 1 year, thinks it may be work related. Was given Alphonso Jacques and LANRE for treatment.)      ASSESSMENT/PLAN:  1. Lumbar pain  -     [facility] - [facility]'s  -     XR LUMBAR SPINE (2-3 VIEWS); Future  2. Screening for malignant neoplasm of colon  -     Grey Santiago MD, Gastroenterology, [address]  3. Encounter for screening mammogram for breast cancer  -     JASMINE DIGITAL SCREEN W OR WO CAD BILATERAL; Future  4. Screening for diabetes mellitus  5. Screening, lipid  6. Medication management  -     CBC; Future      No follow-ups on file. Subjective   SUBJECTIVE/OBJECTIVE:  HPI  Pt states standing for long periods of time at work make her feet and posterior legs numb all the way to her lower back and then her lower back starts to hurt. She has taken some tylenol, didn't help. Review of Systems   Constitutional:  Negative for chills and fever. Respiratory:  Negative for cough and shortness of breath. Cardiovascular:  Negative for chest pain, palpitations and leg swelling. Objective   Vitals:    23 1012   BP: 109/74   Pulse: 79   Temp: 97.2 °F (36.2 °C)   SpO2: 96%     Physical Exam  Constitutional:       Appearance: She is well-developed. HENT:      Right Ear: External ear normal.      Left Ear: External ear normal.      Nose: Nose normal.   Cardiovascular:      Rate and Rhythm: Normal rate and regular rhythm. Heart sounds: Normal heart sounds, S1 normal and S2 normal.   Pulmonary:      Effort: Pulmonary effort is normal. No respiratory distress. Breath sounds: Normal breath sounds. Musculoskeletal:         General: No deformity. Normal range of motion.       Cervical back: Full passive range of motion without pain and normal

## 2023-06-21 NOTE — TELEPHONE ENCOUNTER
Patient is requesting a call regarding a new patient appointment and can be reached at [phone].

## 2023-06-28 ENCOUNTER — TELEPHONE (OUTPATIENT)
Dept: GASTROENTEROLOGY | Age: 58
End: 2023-06-28

## 2023-07-10 ENCOUNTER — TELEPHONE (OUTPATIENT)
Dept: FAMILY MEDICINE CLINIC | Age: 58
End: 2023-07-10

## 2023-07-11 DIAGNOSIS — E87.6 HYPOKALEMIA: Primary | ICD-10-CM

## 2023-07-17 NOTE — TELEPHONE ENCOUNTER
Called patient to schedule Np appointment patient was unable to talk and would like a called back after 1:00 pm today to schedule appointment.

## 2023-08-09 RX ORDER — VITAMIN B COMPLEX
TABLET ORAL
Qty: 30 TABLET | Refills: 5 | Status: SHIPPED | OUTPATIENT
Start: 2023-08-09

## 2023-09-07 RX ORDER — MELATONIN
Qty: 30 TABLET | Refills: 5 | Status: SHIPPED | OUTPATIENT
Start: 2023-09-07

## 2023-11-05 DIAGNOSIS — J01.90 ACUTE BACTERIAL SINUSITIS: ICD-10-CM

## 2023-11-05 DIAGNOSIS — B96.89 ACUTE BACTERIAL SINUSITIS: ICD-10-CM

## 2023-11-06 RX ORDER — LORATADINE 10 MG/1
TABLET ORAL
Qty: 90 TABLET | Refills: 1 | Status: SHIPPED | OUTPATIENT
Start: 2023-11-06

## 2023-11-23 DIAGNOSIS — I10 ESSENTIAL HYPERTENSION: ICD-10-CM

## 2023-11-24 RX ORDER — LOSARTAN POTASSIUM AND HYDROCHLOROTHIAZIDE 12.5; 5 MG/1; MG/1
TABLET ORAL
Qty: 90 TABLET | Refills: 1 | Status: SHIPPED | OUTPATIENT
Start: 2023-11-24

## 2024-01-19 ENCOUNTER — HOSPITAL ENCOUNTER (OUTPATIENT)
Age: 59
Setting detail: SPECIMEN
Discharge: HOME | End: 2024-01-19

## 2024-01-19 ENCOUNTER — OFFICE VISIT (OUTPATIENT)
Dept: PRIMARY CARE CLINIC | Age: 59
End: 2024-01-19

## 2024-01-19 VITALS
OXYGEN SATURATION: 98 % | WEIGHT: 178.8 LBS | BODY MASS INDEX: 29.75 KG/M2 | DIASTOLIC BLOOD PRESSURE: 86 MMHG | SYSTOLIC BLOOD PRESSURE: 127 MMHG | HEART RATE: 88 BPM

## 2024-01-19 DIAGNOSIS — N76.0 BACTERIAL VAGINOSIS: Primary | ICD-10-CM

## 2024-01-19 DIAGNOSIS — B96.89 BACTERIAL VAGINOSIS: Primary | ICD-10-CM

## 2024-01-19 DIAGNOSIS — N39.0 URINARY TRACT INFECTION WITHOUT HEMATURIA, SITE UNSPECIFIED: ICD-10-CM

## 2024-01-19 DIAGNOSIS — R10.9 ACUTE FLANK PAIN: ICD-10-CM

## 2024-01-19 DIAGNOSIS — N76.0 BACTERIAL VAGINOSIS: ICD-10-CM

## 2024-01-19 DIAGNOSIS — B96.89 BACTERIAL VAGINOSIS: ICD-10-CM

## 2024-01-19 DIAGNOSIS — N89.8 VAGINAL ODOR: ICD-10-CM

## 2024-01-19 LAB
BILIRUBIN, POC: ABNORMAL
BLOOD URINE, POC: ABNORMAL
CLARITY, POC: CLEAR
COLOR, POC: ABNORMAL
GLUCOSE URINE, POC: ABNORMAL
KETONES, POC: ABNORMAL
LEUKOCYTE EST, POC: 15
NITRITE, POC: ABNORMAL
PH, POC: 8
PROTEIN, POC: 15
SPECIFIC GRAVITY, POC: 1.01
UROBILINOGEN, POC: 0.2

## 2024-01-19 RX ORDER — METRONIDAZOLE 500 MG/1
500 TABLET ORAL 2 TIMES DAILY
Qty: 14 TABLET | Refills: 0 | Status: SHIPPED | OUTPATIENT
Start: 2024-01-19 | End: 2024-01-26

## 2024-01-19 NOTE — PROGRESS NOTES
[facility]  [facility] PRIMARY CARE  [facility] MAIN FLOOR  [facility] [address]  Dept: [phone]  Dept Fax: [phone]    Liudmila Tesfaye (:  1965) is a 58 y.o. female,Established patient, here for evaluation of the following chief complaint(s):  Other (Patient is here today for possible bacteria infection)         ASSESSMENT/PLAN:  1. Bacterial vaginosis  -     metroNIDAZOLE (FLAGYL) 500 MG tablet; Take 1 tablet by mouth 2 times daily for 7 days, Disp-14 tablet, R-0Normal  -     Vaginitis DNA Probe; Future  2. Urinary tract infection without hematuria, site unspecified  -     POCT Urinalysis no Micro  3. Vaginal odor  -     metroNIDAZOLE (FLAGYL) 500 MG tablet; Take 1 tablet by mouth 2 times daily for 7 days, Disp-14 tablet, R-0Normal  4. Acute flank pain  -     Culture, Urine; Future    Presumed BV as patient did start with [place] without improvement.  Normal external genitalia, no concern for herpetic infection.  Clinic urinalysis was normal, however patient is complaining of some flank pain so will evaluate further with urine culture.  Patient encouraged to continue with the nasal sprays for mild nasal congestion.  No evidence of bacterial infection on exam    No follow-ups on file.         Subjective   SUBJECTIVE/OBJECTIVE:  Used an OTC 3 day yeast cream, still having odor.  Today having burning with urination and felt discomfort when swabbing.  She is not sexually active    Patient also complaining about ringing in the ears, combined with nasal congestion that is been ongoing the last 3 to 4 days.  There is no cough, no sore throat.    Vaginal Discharge  The patient's primary symptoms include genital itching, a genital odor, pelvic pain and vaginal discharge. The patient's pertinent negatives include no genital lesions or genital rash. The current episode started in the past 7 days. Associated symptoms include chills (gets hot flashes) and

## 2024-01-20 LAB
CANDIDA SPECIES: NEGATIVE
GARDNERELLA VAGINALIS: NEGATIVE
MICROORGANISM SPEC CULT: NORMAL
SOURCE: NORMAL
SPECIMEN DESCRIPTION: NORMAL
TRICHOMONAS: NEGATIVE

## 2024-01-21 LAB
MICROORGANISM SPEC CULT: ABNORMAL
SPECIMEN DESCRIPTION: ABNORMAL

## 2024-01-22 DIAGNOSIS — N30.00 ACUTE CYSTITIS WITHOUT HEMATURIA: Primary | ICD-10-CM

## 2024-01-22 RX ORDER — NITROFURANTOIN 25; 75 MG/1; MG/1
100 CAPSULE ORAL 2 TIMES DAILY
Qty: 14 CAPSULE | Refills: 0 | Status: SHIPPED | OUTPATIENT
Start: 2024-01-22 | End: 2024-01-29

## 2024-02-14 ENCOUNTER — OFFICE VISIT (OUTPATIENT)
Dept: PRIMARY CARE CLINIC | Age: 59
End: 2024-02-14
Payer: COMMERCIAL

## 2024-02-14 ENCOUNTER — HOSPITAL ENCOUNTER (OUTPATIENT)
Dept: GENERAL RADIOLOGY | Age: 59
Discharge: HOME | End: 2024-02-16
Payer: COMMERCIAL

## 2024-02-14 ENCOUNTER — HOSPITAL ENCOUNTER (OUTPATIENT)
Age: 59
Setting detail: SPECIMEN
Discharge: HOME | End: 2024-02-14

## 2024-02-14 ENCOUNTER — HOSPITAL ENCOUNTER (OUTPATIENT)
Age: 59
Discharge: HOME | End: 2024-02-16
Payer: COMMERCIAL

## 2024-02-14 VITALS
BODY MASS INDEX: 30.82 KG/M2 | DIASTOLIC BLOOD PRESSURE: 78 MMHG | HEIGHT: 65 IN | WEIGHT: 185 LBS | SYSTOLIC BLOOD PRESSURE: 120 MMHG | HEART RATE: 76 BPM

## 2024-02-14 DIAGNOSIS — F33.2 MAJOR DEPRESSIVE DISORDER, RECURRENT, SEVERE WITHOUT PSYCHOTIC FEATURES (HCC): ICD-10-CM

## 2024-02-14 DIAGNOSIS — R19.5 CHANGE IN CONSISTENCY OF STOOL: ICD-10-CM

## 2024-02-14 DIAGNOSIS — N89.8 VAGINAL ODOR: ICD-10-CM

## 2024-02-14 DIAGNOSIS — R19.5 CHANGE IN CONSISTENCY OF STOOL: Primary | ICD-10-CM

## 2024-02-14 PROCEDURE — 74018 RADEX ABDOMEN 1 VIEW: CPT

## 2024-02-14 PROCEDURE — 99214 OFFICE O/P EST MOD 30 MIN: CPT | Performed by: NURSE PRACTITIONER

## 2024-02-14 ASSESSMENT — PATIENT HEALTH QUESTIONNAIRE - PHQ9
2. FEELING DOWN, DEPRESSED OR HOPELESS: 0
SUM OF ALL RESPONSES TO PHQ QUESTIONS 1-9: 6
4. FEELING TIRED OR HAVING LITTLE ENERGY: 1
SUM OF ALL RESPONSES TO PHQ QUESTIONS 1-9: 6
1. LITTLE INTEREST OR PLEASURE IN DOING THINGS: 0
10. IF YOU CHECKED OFF ANY PROBLEMS, HOW DIFFICULT HAVE THESE PROBLEMS MADE IT FOR YOU TO DO YOUR WORK, TAKE CARE OF THINGS AT HOME, OR GET ALONG WITH OTHER PEOPLE: 0
8. MOVING OR SPEAKING SO SLOWLY THAT OTHER PEOPLE COULD HAVE NOTICED. OR THE OPPOSITE, BEING SO FIGETY OR RESTLESS THAT YOU HAVE BEEN MOVING AROUND A LOT MORE THAN USUAL: 0
5. POOR APPETITE OR OVEREATING: 1
SUM OF ALL RESPONSES TO PHQ QUESTIONS 1-9: 6
6. FEELING BAD ABOUT YOURSELF - OR THAT YOU ARE A FAILURE OR HAVE LET YOURSELF OR YOUR FAMILY DOWN: 0
7. TROUBLE CONCENTRATING ON THINGS, SUCH AS READING THE NEWSPAPER OR WATCHING TELEVISION: 1
SUM OF ALL RESPONSES TO PHQ9 QUESTIONS 1 & 2: 0
9. THOUGHTS THAT YOU WOULD BE BETTER OFF DEAD, OR OF HURTING YOURSELF: 0
3. TROUBLE FALLING OR STAYING ASLEEP: 3
SUM OF ALL RESPONSES TO PHQ QUESTIONS 1-9: 6

## 2024-02-14 NOTE — PROGRESS NOTES
Liudmila Tesfaye (:  1965) is a 58 y.o. female,Established patient, here for evaluation of the following chief complaint(s):  Back Pain (Back/pelvic pain. Has been \"a while\" per patient. Says she was seen \"a while\" ago for issue and it is still bothering her.) and Tinnitus (Both ears has been ringing \"for a while\" per patient)         ASSESSMENT/PLAN:  1. Change in consistency of stool  -     XR ABDOMEN (KUB) (SINGLE AP VIEW); Future  -     [facility]  2. Vaginal odor  -     Vaginitis DNA Probe; Future  3. Major depressive disorder, recurrent, severe without psychotic features (HCC)  Mood stable.     No follow-ups on file.         Subjective   SUBJECTIVE/OBJECTIVE:  Back Pain  Pertinent negatives include no chest pain, fever or pelvic pain.     Pt feels she is getting low back pain when she is constipated. She used a suppository, this did help but very temporarily\. She states her stools vary in consistency, sometimes they are solid, sometimes they are liquid.  There is no blood.  Today when she had a small bm it smelled yeasty. She did have an xray of her lower back in  that showed mild degenerative spondylosis. She was seen a month ago for dark urine and odor and she was treated for bv. Standing at work makes her back hurt worse sometimes.     Review of Systems   Constitutional:  Negative for chills and fever.   Respiratory:  Negative for cough and shortness of breath.    Cardiovascular:  Negative for chest pain, palpitations and leg swelling.   Genitourinary:  Negative for hematuria, pelvic pain, urgency, vaginal bleeding, vaginal discharge and vaginal pain.   Musculoskeletal:  Positive for back pain.     Objective   Vitals:    24 1439   BP: 120/78   Pulse: 76     Physical Exam  Constitutional:       Appearance: She is well-developed.   HENT:      Right Ear: Tympanic membrane and external ear normal.      Left Ear: Tympanic membrane and external ear normal.

## 2024-02-15 LAB
CANDIDA SPECIES: NEGATIVE
GARDNERELLA VAGINALIS: NEGATIVE
SOURCE: NORMAL
TRICHOMONAS: NEGATIVE

## 2024-03-18 ENCOUNTER — TELEPHONE (OUTPATIENT)
Dept: PRIMARY CARE CLINIC | Age: 59
End: 2024-03-18

## 2024-03-18 NOTE — TELEPHONE ENCOUNTER
Patient called and wanted to know if you could call her in a z pac, due to she thinks she has a sinus infection

## 2024-03-27 ENCOUNTER — OFFICE VISIT (OUTPATIENT)
Dept: PRIMARY CARE CLINIC | Age: 59
End: 2024-03-27
Payer: COMMERCIAL

## 2024-03-27 VITALS
SYSTOLIC BLOOD PRESSURE: 117 MMHG | BODY MASS INDEX: 30.79 KG/M2 | HEART RATE: 83 BPM | OXYGEN SATURATION: 97 % | DIASTOLIC BLOOD PRESSURE: 84 MMHG | WEIGHT: 185 LBS

## 2024-03-27 DIAGNOSIS — R09.81 SINUS CONGESTION: Primary | ICD-10-CM

## 2024-03-27 PROCEDURE — 99213 OFFICE O/P EST LOW 20 MIN: CPT | Performed by: NURSE PRACTITIONER

## 2024-03-27 RX ORDER — LORATADINE PSEUDOEPHEDRINE SULFATE 10; 240 MG/1; MG/1
1 TABLET, EXTENDED RELEASE ORAL DAILY
Qty: 21 TABLET | Refills: 0 | Status: SHIPPED | OUTPATIENT
Start: 2024-03-27

## 2024-03-27 ASSESSMENT — ENCOUNTER SYMPTOMS
SINUS COMPLAINT: 1
COUGH: 0
SHORTNESS OF BREATH: 0

## 2024-03-27 NOTE — PROGRESS NOTES
Liudmila Tesfaye (:  1965) is a 58 y.o. female,Established patient, here for evaluation of the following chief complaint(s):  Cough (Sick visit )         ASSESSMENT/PLAN:  1. Sinus congestion      No follow-ups on file.         Subjective   SUBJECTIVE/OBJECTIVE:  Sinus Problem  This is a new problem. The current episode started 1 to 4 weeks ago. The problem has been gradually improving since onset. There has been no fever. Pertinent negatives include no chills, coughing or shortness of breath. Past treatments include saline nose sprays (cough syrup). The treatment provided mild relief.       Review of Systems   Constitutional:  Negative for chills and fever.   Respiratory:  Negative for cough and shortness of breath.    Cardiovascular:  Negative for chest pain, palpitations and leg swelling.          Objective   Vitals:    24 1254   BP: 117/84   Pulse: 83   SpO2: 97%     Physical Exam  Constitutional:       Appearance: She is well-developed.   HENT:      Right Ear: External ear normal.      Left Ear: External ear normal.      Nose: Nose normal.   Cardiovascular:      Rate and Rhythm: Normal rate and regular rhythm.      Heart sounds: Normal heart sounds, S1 normal and S2 normal.   Pulmonary:      Effort: Pulmonary effort is normal. No respiratory distress.      Breath sounds: Normal breath sounds.   Musculoskeletal:         General: No deformity. Normal range of motion.      Cervical back: Full passive range of motion without pain and normal range of motion.   Skin:     General: Skin is warm and dry.   Neurological:      Mental Status: She is alert and oriented to person, place, and time.            An electronic signature was used to authenticate this note.    --ADRIÁN MOLINA - CNP

## 2024-04-11 ENCOUNTER — TELEPHONE (OUTPATIENT)
Dept: GASTROENTEROLOGY | Age: 59
End: 2024-04-11

## 2024-04-11 NOTE — TELEPHONE ENCOUNTER
Writer called and had to leave a message for patient to call back for a referral and patient has canceled 2 x already. So if she calls back she needs an appt. And not with pangulur cause of him leaving.

## 2024-04-30 ENCOUNTER — TELEPHONE (OUTPATIENT)
Dept: GASTROENTEROLOGY | Age: 59
End: 2024-04-30

## 2024-09-12 ENCOUNTER — TRANSCRIBE ORDERS (OUTPATIENT)
Dept: ADMINISTRATIVE | Age: 59
End: 2024-09-12

## 2024-09-12 DIAGNOSIS — R20.1 HYPOESTHESIA OF SKIN: Primary | ICD-10-CM
